# Patient Record
Sex: FEMALE | Race: BLACK OR AFRICAN AMERICAN | Employment: OTHER | URBAN - METROPOLITAN AREA
[De-identification: names, ages, dates, MRNs, and addresses within clinical notes are randomized per-mention and may not be internally consistent; named-entity substitution may affect disease eponyms.]

---

## 2019-07-30 LAB
CHLAMYDIA, EXTERNAL: NORMAL
HBSAG, EXTERNAL: NORMAL
HEPATITIS C AB,   EXT: NORMAL
HIV, EXTERNAL: NORMAL
N. GONORRHEA, EXTERNAL: NORMAL
RPR, EXTERNAL: NORMAL
RUBELLA, EXTERNAL: NORMAL

## 2019-09-06 ENCOUNTER — HOSPITAL ENCOUNTER (INPATIENT)
Age: 39
LOS: 1 days | Discharge: HOME | DRG: 566 | End: 2019-09-07
Attending: EMERGENCY MEDICINE | Admitting: HOSPITALIST
Payer: MEDICAID

## 2019-09-06 DIAGNOSIS — O99.019 ANEMIA AFFECTING FIFTH PREGNANCY: ICD-10-CM

## 2019-09-06 DIAGNOSIS — O09.40 ANEMIA AFFECTING FIFTH PREGNANCY: ICD-10-CM

## 2019-09-06 DIAGNOSIS — D50.8 OTHER IRON DEFICIENCY ANEMIA: Primary | ICD-10-CM

## 2019-09-06 PROBLEM — D64.9 ANEMIA: Status: ACTIVE | Noted: 2019-09-06

## 2019-09-06 PROBLEM — Z34.82 SUPERVISION OF NORMAL INTRAUTERINE PREGNANCY IN MULTIGRAVIDA IN SECOND TRIMESTER: Status: ACTIVE | Noted: 2019-09-06

## 2019-09-06 PROBLEM — O13.2 PREGNANCY-INDUCED HYPERTENSION IN SECOND TRIMESTER: Status: ACTIVE | Noted: 2019-09-06

## 2019-09-06 LAB
FERRITIN SERPL-MCNC: 5 NG/ML (ref 8–388)
FOLATE SERPL-MCNC: >20 NG/ML (ref 3.1–17.5)
IRON SATN MFR SERPL: 4 %
IRON SERPL-MCNC: 21 UG/DL (ref 50–175)
RETICS/RBC NFR AUTO: 1.8 % (ref 0.5–2.3)
TIBC SERPL-MCNC: 520 UG/DL (ref 250–450)
VIT B12 SERPL-MCNC: 582 PG/ML (ref 211–911)

## 2019-09-06 PROCEDURE — 30233N1 TRANSFUSION OF NONAUTOLOGOUS RED BLOOD CELLS INTO PERIPHERAL VEIN, PERCUTANEOUS APPROACH: ICD-10-PCS | Performed by: HOSPITALIST

## 2019-09-06 PROCEDURE — 86900 BLOOD TYPING SEROLOGIC ABO: CPT

## 2019-09-06 PROCEDURE — 65270000029 HC RM PRIVATE

## 2019-09-06 PROCEDURE — 85045 AUTOMATED RETICULOCYTE COUNT: CPT

## 2019-09-06 PROCEDURE — 82607 VITAMIN B-12: CPT

## 2019-09-06 PROCEDURE — 83540 ASSAY OF IRON: CPT

## 2019-09-06 PROCEDURE — 36430 TRANSFUSION BLD/BLD COMPNT: CPT

## 2019-09-06 PROCEDURE — 86923 COMPATIBILITY TEST ELECTRIC: CPT

## 2019-09-06 PROCEDURE — 99284 EMERGENCY DEPT VISIT MOD MDM: CPT

## 2019-09-06 PROCEDURE — P9016 RBC LEUKOCYTES REDUCED: HCPCS

## 2019-09-06 PROCEDURE — 82728 ASSAY OF FERRITIN: CPT

## 2019-09-06 PROCEDURE — 74011250637 HC RX REV CODE- 250/637: Performed by: HOSPITALIST

## 2019-09-06 PROCEDURE — 4A1HXCZ MONITORING OF PRODUCTS OF CONCEPTION, CARDIAC RATE, EXTERNAL APPROACH: ICD-10-PCS | Performed by: OBSTETRICS & GYNECOLOGY

## 2019-09-06 RX ORDER — SODIUM CHLORIDE 9 MG/ML
250 INJECTION, SOLUTION INTRAVENOUS AS NEEDED
Status: DISCONTINUED | OUTPATIENT
Start: 2019-09-06 | End: 2019-09-07 | Stop reason: HOSPADM

## 2019-09-06 RX ORDER — AMLODIPINE BESYLATE 5 MG/1
10 TABLET ORAL DAILY
Status: DISCONTINUED | OUTPATIENT
Start: 2019-09-06 | End: 2019-09-07 | Stop reason: HOSPADM

## 2019-09-06 RX ADMIN — AMLODIPINE BESYLATE 10 MG: 5 TABLET ORAL at 18:29

## 2019-09-06 NOTE — ED PROVIDER NOTES
EMERGENCY DEPARTMENT HISTORY AND PHYSICAL EXAM    Date: 2019  Patient Name: Jacy Garcia    History of Presenting Illness     Chief Complaint   Patient presents with    Abnormal Lab Results         History Provided By: Patient    Additional History (Context):   Minor Mary Carmen Cabral is a 46 y.o. female with history of chronic anemia, G5, P4 presents to the emergency department C/O low hemoglobin on labs drawn today. Patient was seen by her OB/GYN Dr. Jesse Aguilera for her pre-existing hypertension. Was started on amlodipine however on labs today was noted to have a hemoglobin of 5.8. Pt denies bloody stools, black or tarry stools, hematuria, vaginal bleeding, vaginal pain, abdominal pain, nausea, vomiting, fever, and any other sxs or complaints. PCP: None    Current Facility-Administered Medications   Medication Dose Route Frequency Provider Last Rate Last Dose    0.9% sodium chloride infusion 250 mL  250 mL IntraVENous PRN Agustiady-Becker, Severiano Crete, DO         Current Outpatient Medications   Medication Sig Dispense Refill    amLODIPine (NORVASC) 10 mg tablet Take 10 mg by mouth daily.     Indications: HYPERTENSION         Past History     Past Medical History:  Past Medical History:   Diagnosis Date    Anemia NEC     history of    Essential hypertension     Hypertension     chroni c        Past Surgical History:  Past Surgical History:   Procedure Laterality Date    HX BACK SURGERY  ~     rods in back for scoliosis    HX  SECTION  2000    preeclampsia    HX  SECTION  2001    repeat c/s preeclampsia    HX  SECTION  2002    elective repeat c/s       Family History:  Family History   Problem Relation Age of Onset    Arthritis-osteo Maternal Grandmother        Social History:  Social History     Tobacco Use    Smoking status: Never Smoker    Smokeless tobacco: Never Used   Substance Use Topics    Alcohol use: No     Alcohol/week: 0.0 standard drinks    Drug use: No       Allergies:  No Known Allergies      Review of Systems   Review of Systems   Constitutional: Negative for chills and fever. HENT: Negative for congestion, ear pain, sinus pain and sore throat. Eyes: Negative for pain and visual disturbance. Respiratory: Negative for cough and shortness of breath. Cardiovascular: Negative for chest pain and leg swelling. Gastrointestinal: Negative for abdominal pain, constipation, diarrhea, nausea and vomiting. Genitourinary: Negative for dysuria, hematuria, vaginal bleeding and vaginal discharge. Musculoskeletal: Negative for back pain and neck pain. Skin: Negative for rash and wound. Neurological: Negative for dizziness, tremors, weakness, light-headedness and numbness. All other systems reviewed and are negative. Physical Exam     Vitals:    09/06/19 1451   BP: (!) 166/93   Pulse: (!) 103   Resp: 20   Temp: 98.7 °F (37.1 °C)   SpO2: 100%   Weight: 59.9 kg (132 lb)   Height: 5' 5\" (1.651 m)     Physical Exam    Nursing note and vitals reviewed    Constitutional: Majo Duckworth -American female, no acute distress  Head: Normocephalic, Atraumatic  Eyes: Pupils are equal, round, and reactive to light, EOMI, pale conjunctiva  Neck: Supple, non-tender  Cardiovascular: Tachycardic, no murmurs, rubs, or gallops  Chest: Normal work of breathing and chest excursion bilaterally  Lungs: Clear to ausculation bilaterally, no wheezes, no rhonchi  Abdomen: Soft, non tender, non distended, normoactive bowel sounds  Back: No evidence of trauma or deformity  Extremities: No evidence of trauma or deformity, no LE edema  Skin: Warm and dry, normal cap refill  Neuro: Alert and appropriate, CN intact, normal speech, moving all 4 extremities freely and symmetrically  Psychiatric: Normal mood and affect       Diagnostic Study Results     Labs -   No results found for this or any previous visit (from the past 12 hour(s)).     Radiologic Studies -   No orders to display     CT Results  (Last 48 hours)    None        CXR Results  (Last 48 hours)    None            Medical Decision Making   I am the first provider for this patient. I reviewed the vital signs, available nursing notes, past medical history, past surgical history, family history and social history. Vital Signs-Reviewed the patient's vital signs. Pulse Oximetry Analysis -100 % on room air    Records Reviewed: Nursing Notes and Old Medical Records    Provider Notes:   46 y.o. female G5, P4 presenting with known anemia. On exam patient is afebrile. Hypertensive, tachycardic. Pale conjunctiva however not appearing acutely ill or in acute respiratory distress. Patient had blood work drawn today which showed a hemoglobin of 5.8. Will consent patient for transfusion and admit. Procedures:  Procedures    ED Course:   3:00 PM   Initial assessment performed. The patients presenting problems have been discussed, and they are in agreement with the care plan formulated and outlined with them. I have encouraged them to ask questions as they arise throughout their visit. Diagnosis and Disposition       Core Measures:  For Hospitalized Patients:    1. Hospitalization Decision Time:  The decision to hospitalize the patient was made by Citlalli Boland DO at 3:10 PM on 9/6/2019    2. Aspirin: Aspirin was not given because the patient did not present with a stroke at the time of their Emergency Department evaluation    3:10 PM  Patient is being admitted to the hospital by Marcia Bajwa MD  . The results of their tests and reasons for their admission have been discussed with them and/or available family. They convey agreement and understanding for the need to be admitted and for their admission diagnosis. CLINICAL IMPRESSION:    1. Other iron deficiency anemia    2.  Anemia affecting fifth pregnancy      ____________________________________     Please note that this dictation was completed with Dragon, the computer voice recognition software. Quite often unanticipated grammatical, syntax, homophones, and other interpretive errors are inadvertently transcribed by the computer software. Please disregard these errors. Please excuse any errors that have escaped final proofreading.

## 2019-09-06 NOTE — ROUTINE PROCESS
TRANSFER - IN REPORT:    Verbal report received from [facility], [address] [facility] (name) on Brandie Morley  being received from ED (unit) for routine progression of care      Report consisted of patients Situation, Background, Assessment and   Recommendations(SBAR). Information from the following report(s) SBAR, Kardex, ED Summary, Intake/Output, MAR and Recent Results was reviewed with the receiving nurse. Opportunity for questions and clarification was provided. Assessment completed upon patients arrival to unit and care assumed. RN clarified with Chris Freedman RN from [facility] that this pt is appropriate for medical floor. Per Chris Freedman, no special monitoring indicated at this phase as fetus is not yet viable. Nursing supervisor, Ju Garcia, aware of interaction.

## 2019-09-06 NOTE — ED NOTES
TRANSFER - OUT REPORT:    Verbal report given to Pedro Luis Garay (name) on Loree Gilbertmauricio  being transferred to Medical (unit) for routine progression of care       Report consisted of patients Situation, Background, Assessment and   Recommendations(SBAR). Information from the following report(s) SBAR, ED Summary and MAR was reviewed with the receiving nurse. Lines:   Peripheral IV 09/06/19 Left Arm (Active)   Site Assessment Clean, dry, & intact 9/6/2019  3:36 PM   Phlebitis Assessment 0 9/6/2019  3:36 PM   Infiltration Assessment 0 9/6/2019  3:36 PM   Dressing Status Clean, dry, & intact 9/6/2019  3:36 PM   Dressing Type Transparent 9/6/2019  3:36 PM        Opportunity for questions and clarification was provided.       Patient transported with:   Registered Nurse

## 2019-09-06 NOTE — PROGRESS NOTES
Summary -- Pt comfortable upon arrival to unit. Tolerating blood transfusion well. RN received TO from [facility] for BID fetal monitoring, to begin tonight. L&D aware. Pt in good spirits, excellent appetite. Pt hypertensive, states has experienced similar HTN before, when last pregnant (7yrs ago). States BP was very responsive to 10mg Norvasc, which pt initiated this evening. Able to provide teachback on med/side effects. No new concerns/complaints. Blood re-verified with receiving PM nurse. Patient Vitals for the past 8 hrs:   Temp Pulse Resp BP SpO2   09/06/19 1922 98.6 °F (37 °C) 99 15 (!) 176/94 100 %   09/06/19 1909 98.8 °F (37.1 °C) 85 16 (!) 158/98 100 %   09/06/19 1839 98.8 °F (37.1 °C) 98 16 (!) 177/106 100 %   09/06/19 1756 98.1 °F (36.7 °C) 86 16 (!) 178/98 100 %   09/06/19 1745 98.6 °F (37 °C) 89 17 (!) 186/91    09/06/19 1731 98.6 °F (37 °C) 86 20 (!) 187/94 100 %   09/06/19 1726 98.4 °F (36.9 °C) 91 25 (!) 195/110 100 %   09/06/19 1719 99.1 °F (37.3 °C) 88 19 (!) 169/105 100 %   09/06/19 1714 99.1 °F (37.3 °C) 90 16 (!) 190/96 100 %   09/06/19 1702 98.4 °F (36.9 °C) 88 17 (!) 172/101 100 %   09/06/19 1644 98.5 °F (36.9 °C) 90 13 (!) 178/101 100 %   09/06/19 1532  97 22 (!) 190/97    09/06/19 1451 98.7 °F (37.1 °C) (!) 103 20 (!) 166/93 100 %       Bedside shift change report given to DESHAUN Doe (oncoming nurse) by Chares Lundborg, RN (offgoing nurse). Report included the following information SBAR, Kardex, ED Summary, Intake/Output, MAR and Recent Results.

## 2019-09-06 NOTE — H&P
History & Physical    Patient: Danielle Liu MRN: [id number]  CSN: [id number]    YOB: 1980  Age: [age] [facility] y.o. Sex: female      DOA: 2019  Primary Care Provider:  None      Assessment/Plan     Patient Active Problem List   Diagnosis Code    Anemia D64.9    Supervision of normal intrauterine pregnancy in multigravida in second trimester Z34.82    Pregnancy-induced hypertension in second trimester O13.2       Admit to medical floor    Anemia - likely iron def, she is getting blood transfusion. Follow H/H  Check iron panel, ferritin, B12/folate  Denies any dark stools or other bleeding. PIH - she was started on norvasc by Dr. Krystin iL, will continue this. She has not tolerated labetalol before. Estimated length of stay : 1-2 days    CC: anemia       HPI:     Danielle Liu is a [age] [facility] y.o. female who is  is sent to ER by Dr. Krystin Li secondary to anemia. She was seen by him in clinic today and blood work showed Hb of 5.8. She denies any bleeding issues or dark stools. She has been feeling dizzy and weak. She also has history of PIH. Her BP has been elevated. She was initially given labetalol however she did not tolerate this. It was switched to norvasc. She denies any chest pain, SOB, fever/chills.     Past Medical History:   Diagnosis Date    Anemia NEC     history of    Essential hypertension     Hypertension     chroni c        Past Surgical History:   Procedure Laterality Date    HX BACK SURGERY  ~     rods in back for scoliosis    HX  SECTION  2000    preeclampsia    HX  SECTION  2001    repeat c/s preeclampsia    HX  SECTION  2002    elective repeat c/s       Family History   Problem Relation Age of Onset    Arthritis-osteo Maternal Grandmother        Social History     Socioeconomic History    Marital status:      Spouse name: Not on file    Number of children: Not on file    Years of education: Not on file  Highest education level: Not on file   Tobacco Use    Smoking status: Never Smoker    Smokeless tobacco: Never Used   Substance and Sexual Activity    Alcohol use: No     Alcohol/week: 0.0 standard drinks    Drug use: No    Sexual activity: Yes     Partners: Male     Birth control/protection: None       Prior to Admission medications    Medication Sig Start Date End Date Taking? Authorizing Provider   amLODIPine (NORVASC) 10 mg tablet Take 10 mg by mouth daily. Indications: HYPERTENSION    Provider, Historical       No Known Allergies    Review of Systems  Gen: No fever, chills, malaise, weight loss/gain. Heent: No headache, rhinorrhea, epistaxis, ear pain, hearing loss, sinus pain, neck pain/stiffness, sore throat. Heart: No chest pain, palpitations, DANG, pnd, or orthopnea. Resp: No cough, hemoptysis, wheezing and shortness of breath. GI: No nausea, vomiting, diarrhea, constipation, melena or hematochezia. : see above. Derm: No rash, new skin lesion or pruritis. Musc/skeletal: no bone or joint complains. Vasc: No edema, cyanosis or claudication. Endo: No heat/cold intolerance, no polyuria,polydipsia or polyphagia. Neuro: No unilateral weakness, numbness, tingling. No seizures. Heme: see above          Physical Exam:     Physical Exam:  Visit Vitals  BP (!) 195/110 (BP 1 Location: Left arm, BP Patient Position: At rest)   Pulse 91   Temp 98.4 °F (36.9 °C)   Resp 25   Ht 5' 5\" (1.651 m)   Wt 59.9 kg (132 lb)   SpO2 100%   BMI 21.97 kg/m²      O2 Device: Room air    Temp (24hrs), Av.7 °F (37.1 °C), Min:98.4 °F (36.9 °C), Max:99.1 °F (37.3 °C)    No intake/output data recorded. No intake/output data recorded. General:  Awake, cooperative, no distress. Head:  Normocephalic, without obvious abnormality, atraumatic. Eyes:  Conjunctivae pale/corneas clear, sclera anicteric, PERRL, EOMs intact. Nose: Nares normal. No drainage or sinus tenderness.    Throat: Lips, mucosa, and tongue normal.    Neck: Supple, symmetrical, trachea midline, no adenopathy. Lungs:   Clear to auscultation bilaterally. Heart:   S1, S2, no murmur, click, rub or gallop. Abdomen: Soft, non-tender, consistent with second trimester pregnancy. Bowel sounds normal. No masses,  No organomegaly. Extremities: Extremities normal, atraumatic, no cyanosis or edema. Capillary refill normal.   Pulses: 2+ and symmetric all extremities. Skin: Skin color pink, turgor normal. No rashes or lesions   Neurologic: CNII-XII intact. No focal motor or sensory deficit.        Labs Reviewed:    CMP: No results found for: NA, K, CL, CO2, AGAP, GLU, BUN, CREA, GFRAA, GFRNA, CA, MG, PHOS, ALB, TBIL, TP, ALB, GLOB, AGRAT, SGOT, ALT, GPT  CBC: No results found for: WBC, HGB, HGBEXT, HCT, HCTEXT, PLT, PLTEXT, HGBEXT, HCTEXT, PLTEXT      Procedures/imaging: see electronic medical records for all procedures/Xrays and details which were not copied into this note but were reviewed prior to creation of Plan        CC: None

## 2019-09-07 VITALS
HEART RATE: 91 BPM | WEIGHT: 130.51 LBS | BODY MASS INDEX: 21.74 KG/M2 | SYSTOLIC BLOOD PRESSURE: 158 MMHG | TEMPERATURE: 98.1 F | RESPIRATION RATE: 16 BRPM | DIASTOLIC BLOOD PRESSURE: 72 MMHG | OXYGEN SATURATION: 100 % | HEIGHT: 65 IN

## 2019-09-07 LAB
ABO + RH BLD: NORMAL
ANION GAP SERPL CALC-SCNC: 9 MMOL/L (ref 3–18)
BLD PROD TYP BPU: NORMAL
BLOOD GROUP ANTIBODIES SERPL: NORMAL
BPU ID: NORMAL
BUN SERPL-MCNC: 6 MG/DL (ref 7–18)
BUN/CREAT SERPL: 11 (ref 12–20)
CALCIUM SERPL-MCNC: 7.9 MG/DL (ref 8.5–10.1)
CALLED TO:,BCALL1: NORMAL
CHLORIDE SERPL-SCNC: 104 MMOL/L (ref 100–111)
CO2 SERPL-SCNC: 23 MMOL/L (ref 21–32)
CREAT SERPL-MCNC: 0.57 MG/DL (ref 0.6–1.3)
CROSSMATCH RESULT,%XM: NORMAL
ERYTHROCYTE [DISTWIDTH] IN BLOOD BY AUTOMATED COUNT: 21.6 % (ref 11.6–14.5)
GLUCOSE SERPL-MCNC: 77 MG/DL (ref 74–99)
HCT VFR BLD AUTO: 25.8 % (ref 35–45)
HGB BLD-MCNC: 7.8 G/DL (ref 12–16)
MCH RBC QN AUTO: 20.4 PG (ref 24–34)
MCHC RBC AUTO-ENTMCNC: 30.2 G/DL (ref 31–37)
MCV RBC AUTO: 67.4 FL (ref 74–97)
PLATELET # BLD AUTO: 440 K/UL (ref 135–420)
PMV BLD AUTO: 8.6 FL (ref 9.2–11.8)
POTASSIUM SERPL-SCNC: 3.7 MMOL/L (ref 3.5–5.5)
RBC # BLD AUTO: 3.83 M/UL (ref 4.2–5.3)
SODIUM SERPL-SCNC: 136 MMOL/L (ref 136–145)
SPECIMEN EXP DATE BLD: NORMAL
STATUS OF UNIT,%ST: NORMAL
UNIT DIVISION, %UDIV: 0
WBC # BLD AUTO: 9 K/UL (ref 4.6–13.2)

## 2019-09-07 PROCEDURE — 80048 BASIC METABOLIC PNL TOTAL CA: CPT

## 2019-09-07 PROCEDURE — 85027 COMPLETE CBC AUTOMATED: CPT

## 2019-09-07 PROCEDURE — 74011250637 HC RX REV CODE- 250/637: Performed by: HOSPITALIST

## 2019-09-07 PROCEDURE — 36415 COLL VENOUS BLD VENIPUNCTURE: CPT

## 2019-09-07 PROCEDURE — 74011250637 HC RX REV CODE- 250/637: Performed by: INTERNAL MEDICINE

## 2019-09-07 RX ORDER — ACETAMINOPHEN 325 MG/1
650 TABLET ORAL ONCE
Status: COMPLETED | OUTPATIENT
Start: 2019-09-07 | End: 2019-09-07

## 2019-09-07 RX ORDER — FERROUS SULFATE 324(65)MG
325 TABLET, DELAYED RELEASE (ENTERIC COATED) ORAL
Qty: 60 TAB | Refills: 0 | Status: SHIPPED | OUTPATIENT
Start: 2019-09-07

## 2019-09-07 RX ADMIN — AMLODIPINE BESYLATE 10 MG: 5 TABLET ORAL at 08:49

## 2019-09-07 RX ADMIN — ACETAMINOPHEN 650 MG: 325 TABLET ORAL at 11:34

## 2019-09-07 NOTE — PROGRESS NOTES
Chart reviewed by CM on call. Met with pt at bedside. States is [facility], does not use DME. Lives with spouse who could provide support as need be. No needs identified at this time. Reason for Admission:   anemia                   RRAT Score:  6                   Plan for utilizing home health:    Not at this time                      Current Advanced Directive/Advance Care Plan:                          Transition of Care Plan:   Family support and provider follow up    Care Management Interventions  PCP Verified by CM:  Yes  Mode of Transport at Discharge: (family)  Current Support Network: Lives with Spouse  Confirm Follow Up Transport: Family  Discharge Location  Discharge Placement: Home with family assistance

## 2019-09-07 NOTE — PROGRESS NOTES
1153 Discharge to home. Instructions reviewed with and understood by patient. Prescription given to patient.

## 2019-09-07 NOTE — PROGRESS NOTES
High Risk Obstetrics Progress Note    Name: Sarah Moraes MRN: [id number]  SSN: [id number]    YOB: 1980  Age: 46 y.o. Sex: female      Subjective:      LOS: 1 day    Estimated Date of Delivery: None noted. Gestational Age Today: Unknown     Patient admitted for anemia and hypertension. States she does not have abdominal pain  , chest pain, contractions, headache , pelvic pressure, shortness of breath, vaginal bleeding  and visual disturbances. She feels better - not as fatigued or dizzy. She is eating well. Denies any active source of bleeding. Objective:     Vitals:  Blood pressure (!) 157/101, pulse 79, temperature 98.4 °F (36.9 °C), resp. rate 16, height 5' 5\" (1.651 m), weight 59.2 kg (130 lb 8.2 oz), SpO2 100 %, not currently breastfeeding. Temp (24hrs), Av.5 °F (36.9 °C), Min:97.6 °F (36.4 °C), Max:99.1 °F (72.3 °C)    Systolic (92RDT), HDM:262 , Min:155 , IJW:961      Diastolic (98EIJ), EBV:14, Min:91, Max:110       Intake and Output:     Date 19 0700 - 19 0659   Shift 2491-0127 0383-2800 0926-8131 24 Hour Total   INTAKE   P.O. 200   200   Shift Total(mL/kg) 200(3.4)   200(3.4)   OUTPUT   Shift Total(mL/kg)       Weight (kg) 59.2 59.2 59.2 59.2       Physical Exam:  Patient without distress.   Heart: Regular rate and rhythm, S1S2 present or without murmur or extra heart sounds  Lung: clear to auscultation throughout lung fields, no wheezes, no rales, no rhonchi and normal respiratory effort  Abdomen: soft, nontender  Lower Extremities:  - No evidence of DVT seen on physical exam.       Membranes:  Intact    Uterine Activity:  None    Fetal Heart Rate:  135        Labs:   Recent Results (from the past 36 hour(s))   TYPE + CROSSMATCH    Collection Time: 19  3:30 PM   Result Value Ref Range    Crossmatch Expiration 2019     ABO/Rh(D) B POSITIVE     Antibody screen NEG     CALLED TO:       1 UNIT READY NOTIFIED Patsy Krabbe RN ED AT 1627 ON 19 BY EJA.    Unit number [id number]     Blood component type  LR     Unit division 00     Status of unit TRANSFUSED     Crossmatch result Compatible    IRON PROFILE    Collection Time: 09/06/19  3:30 PM   Result Value Ref Range    Iron 21 (L) 50 - 175 ug/dL    TIBC 520 (H) 250 - 450 ug/dL    Iron % saturation 4 %   FERRITIN    Collection Time: 09/06/19  3:30 PM   Result Value Ref Range    Ferritin 5 (L) 8 - 388 NG/ML   RETICULOCYTE COUNT    Collection Time: 09/06/19  3:30 PM   Result Value Ref Range    Reticulocyte count 1.8 0.5 - 2.3 %   VITAMIN B12 & FOLATE    Collection Time: 09/06/19  3:30 PM   Result Value Ref Range    Vitamin B12 582 211 - 911 pg/mL    Folate >20.0 (H) 3.10 - 17.50 ng/mL   CBC W/O DIFF    Collection Time: 09/07/19  5:58 AM   Result Value Ref Range    WBC 9.0 4.6 - 13.2 K/uL    RBC 3.83 (L) 4.20 - 5.30 M/uL    HGB 7.8 (L) 12.0 - 16.0 g/dL    HCT 25.8 (L) 35.0 - 45.0 %    MCV 67.4 (L) 74.0 - 97.0 FL    MCH 20.4 (L) 24.0 - 34.0 PG    MCHC 30.2 (L) 31.0 - 37.0 g/dL    RDW 21.6 (H) 11.6 - 14.5 %    PLATELET 592 (H) 129 - 420 K/uL    MPV 8.6 (L) 9.2 - 92.2 FL   METABOLIC PANEL, BASIC    Collection Time: 09/07/19  5:58 AM   Result Value Ref Range    Sodium 136 136 - 145 mmol/L    Potassium 3.7 3.5 - 5.5 mmol/L    Chloride 104 100 - 111 mmol/L    CO2 23 21 - 32 mmol/L    Anion gap 9 3.0 - 18 mmol/L    Glucose 77 74 - 99 mg/dL    BUN 6 (L) 7.0 - 18 MG/DL    Creatinine 0.57 (L) 0.6 - 1.3 MG/DL    BUN/Creatinine ratio 11 (L) 12 - 20      GFR est AA >60 >60 ml/min/1.73m2    GFR est non-AA >60 >60 ml/min/1.73m2    Calcium 7.9 (L) 8.5 - 10.1 MG/DL       Assessment and Plan:      Principal Problem:    Anemia (9/6/2019)    Active Problems:    Supervision of normal intrauterine pregnancy in multigravida in second trimester (9/6/2019)      Pregnancy-induced hypertension in second trimester (9/6/2019)       Anemia - s/p transfusion of 1 unit of PRBC's. Symptomatically improved.  She will start iron supplement as an outpatient and f/u next week for a hgb/hct recheck. Hypertension - no evidence of pre eclampsia. Pt with prior history of gestational hypertension and appears to have the same at this time. She did not tolerate labetalol as an outpatient. She was started on Norvasc which she will continue. F/U closely in 2-3 days for bp re check and adjustment of medication as needed. She will keep appt with M scheduled for 9/26/2019.

## 2019-09-07 NOTE — PROGRESS NOTES
2002: Head to toe assessment completed at this time  Patient is A&OX4. Pt denies N/V chest pain and SOB or distress. Pt is calm and cooperative. Pedal pulses are present. Capillary refill less than 3 seconds. Skin in warm and dry. Lungs are clear bilaterally. Bowel sounds are active. Abdomen is soft and non-tender. Positive dorsalis pedis pulse, sensation, warm. No tingling or numbness to lower extremities. 20 g needle in the left arm. Pain scale explained to patient. Patient instructed to call for prn when needed. Pain level is 0. Patient was oriented to call bell and bed function. Will continue to monitor. 2315: Labor and delivery nurse in room completing fetal doppler assessment. 134: Patient is voiding without issue. Patient denies distress, no complaints of pain. Shift summary: Patient had uneventful shift. Patient ambulated without issue.  No issues/concerns at this time

## 2019-09-07 NOTE — DISCHARGE INSTRUCTIONS
Patient Education        Iron Deficiency Anemia: Care Instructions  Your Care Instructions    Anemia means that you do not have enough red blood cells. Red blood cells carry oxygen around your body. When you have anemia, it can make you pale, weak, and tired. Many things can cause anemia. The most common cause is loss of blood. This can happen if you have heavy menstrual periods. It can also happen if you have bleeding in your stomach or bowel. You can also get anemia if you don't have enough iron in your diet or if it's hard for your body to absorb iron. In some cases, pregnancy causes anemia. That's because a pregnant woman needs more iron. Your doctor may do more tests to find the cause of your anemia. If a disease or other health problem is causing it, your doctor will treat that problem. It's important to follow up with your doctor to make sure that your iron level returns to normal.  Follow-up care is a key part of your treatment and safety. Be sure to make and go to all appointments, and call your doctor if you are having problems. It's also a good idea to know your test results and keep a list of the medicines you take. How can you care for yourself at home? · If your doctor recommended iron pills, take them as directed. ? Try to take the pills on an empty stomach. You can do this about 1 hour before or 2 hours after meals. But you may need to take iron with food to avoid an upset stomach. ? Do not take antacids or drink milk or anything with caffeine within 2 hours of when you take your iron. They can keep your body from absorbing the iron well. ? Vitamin C helps your body absorb iron. You may want to take iron pills with a glass of orange juice or some other food high in vitamin C.  ? Iron pills may cause stomach problems. These include heartburn, nausea, diarrhea, constipation, and cramps. It can help to drink plenty of fluids and include fruits, vegetables, and fiber in your diet.   ? It's normal for iron pills to make your stool a greenish or grayish black. But internal bleeding can also cause dark stool. So it's important to tell your doctor about any color changes. ? Call your doctor if you think you are having a problem with your iron pills. Even after you start to feel better, it will take several months for your body to build up its supply of iron. ? If you miss a pill, don't take a double dose. ? Keep iron pills out of the reach of small children. Too much iron can be very dangerous. · Eat foods with a lot of iron. These include red meat, shellfish, poultry, and eggs. They also include beans, raisins, whole-grain bread, and leafy green vegetables. · Steam your vegetables. This is the best way to prepare them if you want to get as much iron as possible. · Be safe with medicines. Do not take nonsteroidal anti-inflammatory pain relievers unless your doctor tells you to. These include aspirin, naproxen (Aleve), and ibuprofen (Advil, Motrin). · Liquid iron can stain your teeth. But you can mix it with water or juice and drink it with a straw. Then it won't get on your teeth. When should you call for help? Call 911 anytime you think you may need emergency care. For example, call if:    · You passed out (lost consciousness).    Call your doctor now or seek immediate medical care if:    · You are short of breath.     · You are dizzy or light-headed, or you feel like you may faint.     · You have new or worse bleeding.    Watch closely for changes in your health, and be sure to contact your doctor if:    · You feel weaker or more tired than usual.     · You do not get better as expected. Where can you learn more? Go to http://niki-stiven.info/. Enter P326 in the search box to learn more about \"Iron Deficiency Anemia: Care Instructions. \"  Current as of: March 28, 2019  Content Version: 12.1  © 9843-0427 [employer].  Care instructions adapted under license by [facility] (which disclaims liability or warranty for this information). If you have questions about a medical condition or this instruction, always ask your healthcare professional. [address] any warranty or liability for your use of this information. Patient Education        Learning About Blood Transfusions  What is a blood transfusion? Blood transfusion is a medical treatment to replace the blood or parts of blood that your body has lost. The blood goes through a tube from a bag to an intravenous (IV) catheter and into your vein. You may need a blood transfusion after losing blood from an injury, a major surgery, an illness that causes bleeding, or an illness that destroys blood cells. Transfusions are also used to give you the parts of blood--such as platelets, plasma, or substances that cause clotting--that your body needs to fight an illness or stop bleeding. How is a blood transfusion done? Before you receive a blood transfusion, your blood is tested to find out what your blood type is. Blood or blood parts that are a match with your blood type are ordered by your doctor. Blood is typed as A, B, AB, or O. It is also typed as Rh-positive or Rh-negative. Your blood is also screened to look for antibodies that might react with the blood that is given to you. The blood you are getting is checked and rechecked to make sure that it's the right type for you. A sample of your blood is mixed with a sample of the blood you will receive to check for problems. Before actually giving you the transfusion, a doctor and nurses will look at the label on the package of blood and compare it to your hospital ID bracelet and medical records. The transfusion begins only when all agree that this is the correct blood and that you are the correct person to receive it. To receive the transfusion, you will have an intravenous (IV) catheter inserted into a vein.  A tube connects the catheter to the bag containing the blood, which is placed higher than your body. The blood then flows slowly into your vein. A doctor or nurse will check you several times during the transfusion to watch for a reaction or other problems. What are the possible risks? Blood transfusions have many benefits and are often life-saving. But they also have a few risks. Possible risks include:  · Your body's reaction to receiving new blood. This may include:  ? Fever. ? Allergic reactions. ? Breathing problems. · An infection from the blood. This risk is small because of the strict rules placed on handling and storing blood. Getting a viral infection, such as HIV or hepatitis B or C, through blood transfusions has become very rare. The U.S. Food and Drug Administration (FDA) enforces strict guidelines on the collection, testing, storage, and use of blood. · Getting the wrong blood type by accident. Severe reactions, which can be life-threatening, are very rare. What can you expect after a blood transfusion? Here are some things you can do at home to help prevent infection at the transfusion site:  · Wash the area daily with warm, soapy water, and pat it dry. Don't use hydrogen peroxide or alcohol, which can slow healing. You may cover the area with a gauze bandage if it weeps or rubs against clothing. Change the bandage every day. · Keep the area clean and dry. When should you call for help? Call 911 anytime you think you may need emergency care. For example, call if:  · You have severe trouble breathing. Call your doctor now or seek immediate medical care if:  · You have a fever. · You feel weaker or more tired than usual.  · You have a yellow tint to your skin or the whites of your eyes. Watch closely for changes in your health, and be sure to contact your doctor if you have any problems. Follow-up care is a key part of your treatment and safety.  Be sure to make and go to all appointments, and call your doctor if you are having problems. It's also a good idea to know your test results and keep a list of the medicines you take. Where can you learn more? Go to http://niki-stiven.info/. Enter V588 in the search box to learn more about \"Learning About Blood Transfusions. \"  Current as of: March 28, 2019  Content Version: 12.1  © 8266-0444 [employer]. Care instructions adapted under license by [employer] (which disclaims liability or warranty for this information). If you have questions about a medical condition or this instruction, always ask your healthcare professional. [place] [address] any warranty or liability for your use of this information.

## 2019-09-07 NOTE — ROUTINE PROCESS
Bedside shift change report given to Thelbert Runner, RN (oncoming nurse) by Chloe Rosa (offgoing nurse). Report included the following information SBAR, Kardex and MAR.

## 2019-09-07 NOTE — DISCHARGE SUMMARY
Discharge Summary  Subjective:       Admit Date: 2019  Discharge Date:  2019, 11:10 AM    Discharging Physician: Hero Ya pager [phone]  Primary Care Provider:  None    Patient ID:  Maulik Bowden  46 y.o. female  1980    Reason for Admission:  Anemia [D64.9]    Discharge Diagnosis:   - iron deficiency anemia  - pregnancy induced hypertesion  - IUP      Patient Active Problem List   Diagnosis Code    Anemia D64.9    Supervision of normal intrauterine pregnancy in multigravida in second trimester Z34.82    Pregnancy-induced hypertension in second trimester O13.2       Consultants:    [address] [place] [address] Course:   Reason for admission ( Admitting HPI): \" Maulik Bowden is a 46 y.o. female who is  is sent to ER by Dr. Annemarie Adams secondary to anemia. She was seen by him in clinic today and blood work showed Hb of 5.8. She denies any bleeding issues or dark stools. She has been feeling dizzy and weak. She also has history of PIH. Her BP has been elevated. She was initially given labetalol however she did not tolerate this. It was switched to norvasc. She denies any chest pain, SOB, fever/chills. \"    She was admitted for symptomatic anemia. She had prbc transfusion which she tolerated. BP remained stable, Hg is 7.8. She will DC home on po iron, has follow up w OB in 1 week for BP check and for repeat CBC.     Pertinent Imaging/ Operative procedures:       Pertinent Results:    BMP:   Lab Results   Component Value Date/Time     2019 05:58 AM    K 3.7 2019 05:58 AM     2019 05:58 AM    CO2 23 2019 05:58 AM    AGAP 9 2019 05:58 AM    GLU 77 2019 05:58 AM    BUN 6 (L) 2019 05:58 AM    CREA 0.57 (L) 2019 05:58 AM    GFRAA >60 2019 05:58 AM    GFRNA >60 2019 05:58 AM     CBC:   Lab Results   Component Value Date/Time    WBC 9.0 2019 05:58 AM    HGB 7.8 (L) 2019 05:58 AM    HCT 25.8 (L) 09/07/2019 05:58 AM     (H) 09/07/2019 05:58 AM         Physical Exam on Discharge:  Visit Vitals  /72   Pulse 79   Temp 98.4 °F (36.9 °C)   Resp 16   Ht 5' 5\" (1.651 m)   Wt 59.2 kg (130 lb 8.2 oz)   SpO2 100%   Breastfeeding? No   BMI 21.72 kg/m²     Body mass index is 21.72 kg/m². GEN: NAD  HEART: S1 S2  NEURO: nonfocal   Condition at discharge: stable    Discharge Medications  Current Discharge Medication List      START taking these medications    Details   ferrous sulfate 324 mg (65 mg iron) tablet Take 1 Tab by mouth Daily (before breakfast). Qty: 60 Tab, Refills: 0         CONTINUE these medications which have NOT CHANGED    Details   amLODIPine (NORVASC) 10 mg tablet Take 10 mg by mouth daily.     Indications: HYPERTENSION             Disposition: home   Follow up:  OB  Restrictions: none    Diagnostic Imaging/Labs pending at discharge: none      Ebonie Rojas, [address] [facility]  Internal Medicine/Geriatrics    Time Spent 25 minutes with >50% coordination of care          CC: None

## 2019-12-17 RX ORDER — HYDROCORTISONE SODIUM SUCCINATE 100 MG/2ML
100 INJECTION, POWDER, FOR SOLUTION INTRAMUSCULAR; INTRAVENOUS AS NEEDED
Status: CANCELLED | OUTPATIENT
Start: 2019-12-23

## 2019-12-17 RX ORDER — SODIUM CHLORIDE 0.9 % (FLUSH) 0.9 %
10 SYRINGE (ML) INJECTION AS NEEDED
Status: CANCELLED
Start: 2019-12-23

## 2019-12-17 RX ORDER — SODIUM CHLORIDE 9 MG/ML
10 INJECTION INTRAMUSCULAR; INTRAVENOUS; SUBCUTANEOUS AS NEEDED
Status: CANCELLED | OUTPATIENT
Start: 2019-12-23

## 2019-12-17 RX ORDER — DIPHENHYDRAMINE HYDROCHLORIDE 50 MG/ML
50 INJECTION, SOLUTION INTRAMUSCULAR; INTRAVENOUS AS NEEDED
Status: CANCELLED
Start: 2019-12-23

## 2019-12-17 RX ORDER — HEPARIN 100 UNIT/ML
300-500 SYRINGE INTRAVENOUS AS NEEDED
Status: CANCELLED
Start: 2019-12-23

## 2019-12-17 RX ORDER — SODIUM CHLORIDE 9 MG/ML
25 INJECTION, SOLUTION INTRAVENOUS CONTINUOUS
Status: CANCELLED | OUTPATIENT
Start: 2019-12-23

## 2019-12-17 RX ORDER — ACETAMINOPHEN 325 MG/1
650 TABLET ORAL AS NEEDED
Status: CANCELLED
Start: 2019-12-23

## 2019-12-17 RX ORDER — ALBUTEROL SULFATE 0.83 MG/ML
2.5 SOLUTION RESPIRATORY (INHALATION) AS NEEDED
Status: CANCELLED
Start: 2019-12-23

## 2019-12-17 RX ORDER — ONDANSETRON 2 MG/ML
8 INJECTION INTRAMUSCULAR; INTRAVENOUS AS NEEDED
Status: CANCELLED | OUTPATIENT
Start: 2019-12-23

## 2019-12-17 RX ORDER — EPINEPHRINE 1 MG/ML
0.3 INJECTION, SOLUTION, CONCENTRATE INTRAVENOUS AS NEEDED
Status: CANCELLED | OUTPATIENT
Start: 2019-12-23

## 2019-12-23 ENCOUNTER — HOSPITAL ENCOUNTER (OUTPATIENT)
Dept: INFUSION THERAPY | Age: 39
End: 2019-12-23

## 2019-12-23 DIAGNOSIS — D64.9 ANEMIA, UNSPECIFIED TYPE: ICD-10-CM

## 2019-12-23 RX ORDER — DIPHENHYDRAMINE HYDROCHLORIDE 50 MG/ML
50 INJECTION, SOLUTION INTRAMUSCULAR; INTRAVENOUS AS NEEDED
Status: CANCELLED
Start: 2019-12-23

## 2019-12-23 RX ORDER — EPINEPHRINE 1 MG/ML
0.3 INJECTION, SOLUTION, CONCENTRATE INTRAVENOUS AS NEEDED
Status: CANCELLED | OUTPATIENT
Start: 2019-12-23

## 2019-12-23 RX ORDER — ALBUTEROL SULFATE 0.83 MG/ML
2.5 SOLUTION RESPIRATORY (INHALATION) AS NEEDED
Status: CANCELLED
Start: 2019-12-23

## 2019-12-23 RX ORDER — HYDROCORTISONE SODIUM SUCCINATE 100 MG/2ML
100 INJECTION, POWDER, FOR SOLUTION INTRAMUSCULAR; INTRAVENOUS AS NEEDED
Status: CANCELLED | OUTPATIENT
Start: 2019-12-23

## 2019-12-23 RX ORDER — SODIUM CHLORIDE 0.9 % (FLUSH) 0.9 %
10 SYRINGE (ML) INJECTION AS NEEDED
Status: CANCELLED
Start: 2019-12-23

## 2019-12-23 RX ORDER — ONDANSETRON 2 MG/ML
8 INJECTION INTRAMUSCULAR; INTRAVENOUS AS NEEDED
Status: CANCELLED | OUTPATIENT
Start: 2019-12-23

## 2019-12-23 RX ORDER — SODIUM CHLORIDE 9 MG/ML
10 INJECTION INTRAMUSCULAR; INTRAVENOUS; SUBCUTANEOUS AS NEEDED
Status: CANCELLED | OUTPATIENT
Start: 2019-12-23

## 2019-12-23 RX ORDER — SODIUM CHLORIDE 9 MG/ML
25 INJECTION, SOLUTION INTRAVENOUS CONTINUOUS
Status: CANCELLED | OUTPATIENT
Start: 2019-12-23

## 2019-12-23 RX ORDER — ACETAMINOPHEN 325 MG/1
650 TABLET ORAL AS NEEDED
Status: CANCELLED
Start: 2019-12-23

## 2019-12-23 RX ORDER — HEPARIN 100 UNIT/ML
300-500 SYRINGE INTRAVENOUS AS NEEDED
Status: CANCELLED
Start: 2019-12-23

## 2019-12-27 ENCOUNTER — ANESTHESIA EVENT (OUTPATIENT)
Dept: LABOR AND DELIVERY | Age: 39
DRG: 540 | End: 2019-12-27
Payer: MEDICAID

## 2019-12-27 ENCOUNTER — HOSPITAL ENCOUNTER (INPATIENT)
Age: 39
LOS: 4 days | Discharge: HOME | DRG: 540 | End: 2019-12-31
Attending: OBSTETRICS & GYNECOLOGY | Admitting: OBSTETRICS & GYNECOLOGY
Payer: MEDICAID

## 2019-12-27 ENCOUNTER — ANESTHESIA (OUTPATIENT)
Dept: LABOR AND DELIVERY | Age: 39
DRG: 540 | End: 2019-12-27
Payer: MEDICAID

## 2019-12-27 PROBLEM — O42.919 PRETERM PREMATURE RUPTURE OF MEMBRANES: Status: ACTIVE | Noted: 2019-12-27

## 2019-12-27 LAB
ALBUMIN SERPL-MCNC: 1.6 G/DL (ref 3.4–5)
ALBUMIN SERPL-MCNC: 2.3 G/DL (ref 3.4–5)
ALBUMIN/GLOB SERPL: 0.5 {RATIO} (ref 0.8–1.7)
ALBUMIN/GLOB SERPL: 0.5 {RATIO} (ref 0.8–1.7)
ALP SERPL-CCNC: 153 U/L (ref 45–117)
ALP SERPL-CCNC: 94 U/L (ref 45–117)
ALT SERPL-CCNC: 31 U/L (ref 13–56)
ALT SERPL-CCNC: 36 U/L (ref 13–56)
ANION GAP SERPL CALC-SCNC: 7 MMOL/L (ref 3–18)
ANION GAP SERPL CALC-SCNC: 8 MMOL/L (ref 3–18)
AST SERPL-CCNC: 43 U/L (ref 10–38)
AST SERPL-CCNC: 49 U/L (ref 10–38)
BASOPHILS # BLD: 0 K/UL (ref 0–0.1)
BASOPHILS # BLD: 0 K/UL (ref 0–0.1)
BASOPHILS NFR BLD: 0 % (ref 0–2)
BASOPHILS NFR BLD: 0 % (ref 0–2)
BILIRUB SERPL-MCNC: 0.3 MG/DL (ref 0.2–1)
BILIRUB SERPL-MCNC: 0.3 MG/DL (ref 0.2–1)
BUN SERPL-MCNC: 6 MG/DL (ref 7–18)
BUN SERPL-MCNC: 9 MG/DL (ref 7–18)
BUN/CREAT SERPL: 13 (ref 12–20)
BUN/CREAT SERPL: 7 (ref 12–20)
CALCIUM SERPL-MCNC: 7.4 MG/DL (ref 8.5–10.1)
CALCIUM SERPL-MCNC: 8.4 MG/DL (ref 8.5–10.1)
CHLORIDE SERPL-SCNC: 107 MMOL/L (ref 100–111)
CHLORIDE SERPL-SCNC: 109 MMOL/L (ref 100–111)
CO2 SERPL-SCNC: 23 MMOL/L (ref 21–32)
CO2 SERPL-SCNC: 24 MMOL/L (ref 21–32)
CREAT SERPL-MCNC: 0.67 MG/DL (ref 0.6–1.3)
CREAT SERPL-MCNC: 0.82 MG/DL (ref 0.6–1.3)
DIFFERENTIAL METHOD BLD: ABNORMAL
DIFFERENTIAL METHOD BLD: ABNORMAL
EOSINOPHIL # BLD: 0.1 K/UL (ref 0–0.4)
EOSINOPHIL # BLD: 0.1 K/UL (ref 0–0.4)
EOSINOPHIL NFR BLD: 0 % (ref 0–5)
EOSINOPHIL NFR BLD: 1 % (ref 0–5)
ERYTHROCYTE [DISTWIDTH] IN BLOOD BY AUTOMATED COUNT: 22.1 % (ref 11.6–14.5)
ERYTHROCYTE [DISTWIDTH] IN BLOOD BY AUTOMATED COUNT: 22.3 % (ref 11.6–14.5)
GLOBULIN SER CALC-MCNC: 3 G/DL (ref 2–4)
GLOBULIN SER CALC-MCNC: 4.6 G/DL (ref 2–4)
GLUCOSE BLD STRIP.AUTO-MCNC: 104 MG/DL (ref 70–110)
GLUCOSE SERPL-MCNC: 120 MG/DL (ref 74–99)
GLUCOSE SERPL-MCNC: 82 MG/DL (ref 74–99)
HCT VFR BLD AUTO: 21.5 % (ref 35–45)
HCT VFR BLD AUTO: 31.8 % (ref 35–45)
HGB BLD-MCNC: 6.5 G/DL (ref 12–16)
HGB BLD-MCNC: 9.8 G/DL (ref 12–16)
LDH SERPL L TO P-CCNC: 235 U/L (ref 81–234)
LYMPHOCYTES # BLD: 1.9 K/UL (ref 0.9–3.6)
LYMPHOCYTES # BLD: 2.2 K/UL (ref 0.9–3.6)
LYMPHOCYTES NFR BLD: 18 % (ref 21–52)
LYMPHOCYTES NFR BLD: 24 % (ref 21–52)
MCH RBC QN AUTO: 25.8 PG (ref 24–34)
MCH RBC QN AUTO: 25.9 PG (ref 24–34)
MCHC RBC AUTO-ENTMCNC: 30.2 G/DL (ref 31–37)
MCHC RBC AUTO-ENTMCNC: 30.8 G/DL (ref 31–37)
MCV RBC AUTO: 84.1 FL (ref 74–97)
MCV RBC AUTO: 85.3 FL (ref 74–97)
MONOCYTES # BLD: 0.7 K/UL (ref 0.05–1.2)
MONOCYTES # BLD: 0.9 K/UL (ref 0.05–1.2)
MONOCYTES NFR BLD: 7 % (ref 3–10)
MONOCYTES NFR BLD: 8 % (ref 3–10)
NEUTS SEG # BLD: 5.3 K/UL (ref 1.8–8)
NEUTS SEG # BLD: 9.3 K/UL (ref 1.8–8)
NEUTS SEG NFR BLD: 67 % (ref 40–73)
NEUTS SEG NFR BLD: 75 % (ref 40–73)
PLATELET # BLD AUTO: 176 K/UL (ref 135–420)
PLATELET # BLD AUTO: 184 K/UL (ref 135–420)
PMV BLD AUTO: 10 FL (ref 9.2–11.8)
PMV BLD AUTO: 10.3 FL (ref 9.2–11.8)
POTASSIUM SERPL-SCNC: 3.8 MMOL/L (ref 3.5–5.5)
POTASSIUM SERPL-SCNC: 4 MMOL/L (ref 3.5–5.5)
PROT SERPL-MCNC: 4.6 G/DL (ref 6.4–8.2)
PROT SERPL-MCNC: 6.9 G/DL (ref 6.4–8.2)
RBC # BLD AUTO: 2.52 M/UL (ref 4.2–5.3)
RBC # BLD AUTO: 3.78 M/UL (ref 4.2–5.3)
SODIUM SERPL-SCNC: 138 MMOL/L (ref 136–145)
SODIUM SERPL-SCNC: 140 MMOL/L (ref 136–145)
URATE SERPL-MCNC: 4.3 MG/DL (ref 2.6–7.2)
WBC # BLD AUTO: 12.4 K/UL (ref 4.6–13.2)
WBC # BLD AUTO: 7.9 K/UL (ref 4.6–13.2)

## 2019-12-27 PROCEDURE — 85025 COMPLETE CBC W/AUTO DIFF WBC: CPT

## 2019-12-27 PROCEDURE — 76010000392 HC C SECN EA ADDL 0.5 HR: Performed by: OBSTETRICS & GYNECOLOGY

## 2019-12-27 PROCEDURE — 74011000250 HC RX REV CODE- 250: Performed by: OBSTETRICS & GYNECOLOGY

## 2019-12-27 PROCEDURE — 76010000391 HC C SECN FIRST 1 HR: Performed by: OBSTETRICS & GYNECOLOGY

## 2019-12-27 PROCEDURE — 59025 FETAL NON-STRESS TEST: CPT

## 2019-12-27 PROCEDURE — 74011000250 HC RX REV CODE- 250: Performed by: ANESTHESIOLOGY

## 2019-12-27 PROCEDURE — 77030011265 HC ELECTRD BLD HEX COVD -A: Performed by: OBSTETRICS & GYNECOLOGY

## 2019-12-27 PROCEDURE — 77030002933 HC SUT MCRYL J&J -A: Performed by: OBSTETRICS & GYNECOLOGY

## 2019-12-27 PROCEDURE — 86920 COMPATIBILITY TEST SPIN: CPT

## 2019-12-27 PROCEDURE — 83615 LACTATE (LD) (LDH) ENZYME: CPT

## 2019-12-27 PROCEDURE — 65270000029 HC RM PRIVATE

## 2019-12-27 PROCEDURE — 74011250637 HC RX REV CODE- 250/637: Performed by: MIDWIFE

## 2019-12-27 PROCEDURE — 77030031139 HC SUT VCRL2 J&J -A: Performed by: OBSTETRICS & GYNECOLOGY

## 2019-12-27 PROCEDURE — 77030040361 HC SLV COMPR DVT MDII -B: Performed by: OBSTETRICS & GYNECOLOGY

## 2019-12-27 PROCEDURE — 74011250636 HC RX REV CODE- 250/636: Performed by: ANESTHESIOLOGY

## 2019-12-27 PROCEDURE — 82962 GLUCOSE BLOOD TEST: CPT

## 2019-12-27 PROCEDURE — 84550 ASSAY OF BLOOD/URIC ACID: CPT

## 2019-12-27 PROCEDURE — 88307 TISSUE EXAM BY PATHOLOGIST: CPT

## 2019-12-27 PROCEDURE — 74011250637 HC RX REV CODE- 250/637: Performed by: ADVANCED PRACTICE MIDWIFE

## 2019-12-27 PROCEDURE — 77030039266 HC ADH SKN EXOFIN S2SG -A: Performed by: OBSTETRICS & GYNECOLOGY

## 2019-12-27 PROCEDURE — 80053 COMPREHEN METABOLIC PANEL: CPT

## 2019-12-27 PROCEDURE — 75410000002 HC LABOR FEE PER 1 HR

## 2019-12-27 PROCEDURE — 74011250636 HC RX REV CODE- 250/636: Performed by: NURSE ANESTHETIST, CERTIFIED REGISTERED

## 2019-12-27 PROCEDURE — 77030014144 HC TY SPN ANES BBMI -B: Performed by: ANESTHESIOLOGY

## 2019-12-27 PROCEDURE — 86900 BLOOD TYPING SEROLOGIC ABO: CPT

## 2019-12-27 PROCEDURE — 75410000003 HC RECOV DEL/VAG/CSECN EA 0.5 HR

## 2019-12-27 PROCEDURE — 77030018836 HC SOL IRR NACL ICUM -A: Performed by: OBSTETRICS & GYNECOLOGY

## 2019-12-27 PROCEDURE — 74011250636 HC RX REV CODE- 250/636: Performed by: MIDWIFE

## 2019-12-27 PROCEDURE — 76060000033 HC ANESTHESIA 1 TO 1.5 HR: Performed by: OBSTETRICS & GYNECOLOGY

## 2019-12-27 PROCEDURE — 75410000003 HC RECOV DEL/VAG/CSECN EA 0.5 HR: Performed by: OBSTETRICS & GYNECOLOGY

## 2019-12-27 PROCEDURE — 86923 COMPATIBILITY TEST ELECTRIC: CPT

## 2019-12-27 PROCEDURE — 99285 EMERGENCY DEPT VISIT HI MDM: CPT

## 2019-12-27 PROCEDURE — 74011250637 HC RX REV CODE- 250/637: Performed by: ANESTHESIOLOGY

## 2019-12-27 RX ORDER — SODIUM CHLORIDE, SODIUM LACTATE, POTASSIUM CHLORIDE, CALCIUM CHLORIDE 600; 310; 30; 20 MG/100ML; MG/100ML; MG/100ML; MG/100ML
125 INJECTION, SOLUTION INTRAVENOUS CONTINUOUS
Status: DISPENSED | OUTPATIENT
Start: 2019-12-27 | End: 2019-12-28

## 2019-12-27 RX ORDER — CEFAZOLIN SODIUM/WATER 2 G/20 ML
2 SYRINGE (ML) INTRAVENOUS ONCE
Status: CANCELLED | OUTPATIENT
Start: 2019-12-27 | End: 2019-12-27

## 2019-12-27 RX ORDER — FACIAL-BODY WIPES
10 EACH TOPICAL
Status: DISCONTINUED | OUTPATIENT
Start: 2019-12-27 | End: 2019-12-27 | Stop reason: SDUPTHER

## 2019-12-27 RX ORDER — OXYCODONE HYDROCHLORIDE 5 MG/1
5 TABLET ORAL
Status: ACTIVE | OUTPATIENT
Start: 2019-12-27 | End: 2019-12-28

## 2019-12-27 RX ORDER — NALOXONE HYDROCHLORIDE 0.4 MG/ML
0.2 INJECTION, SOLUTION INTRAMUSCULAR; INTRAVENOUS; SUBCUTANEOUS
Status: ACTIVE | OUTPATIENT
Start: 2019-12-27 | End: 2019-12-28

## 2019-12-27 RX ORDER — SODIUM CHLORIDE 0.9 % (FLUSH) 0.9 %
5-40 SYRINGE (ML) INJECTION AS NEEDED
Status: DISCONTINUED | OUTPATIENT
Start: 2019-12-27 | End: 2019-12-31 | Stop reason: HOSPADM

## 2019-12-27 RX ORDER — OXYTOCIN/RINGER'S LACTATE 20/1000 ML
125 PLASTIC BAG, INJECTION (ML) INTRAVENOUS CONTINUOUS
Status: DISCONTINUED | OUTPATIENT
Start: 2019-12-27 | End: 2019-12-27 | Stop reason: SDUPTHER

## 2019-12-27 RX ORDER — SODIUM CHLORIDE 9 MG/ML
250 INJECTION, SOLUTION INTRAVENOUS AS NEEDED
Status: DISCONTINUED | OUTPATIENT
Start: 2019-12-27 | End: 2019-12-31

## 2019-12-27 RX ORDER — ZOLPIDEM TARTRATE 5 MG/1
5 TABLET ORAL
Status: DISCONTINUED | OUTPATIENT
Start: 2019-12-27 | End: 2019-12-31 | Stop reason: HOSPADM

## 2019-12-27 RX ORDER — FACIAL-BODY WIPES
10 EACH TOPICAL
Status: DISCONTINUED | OUTPATIENT
Start: 2019-12-27 | End: 2019-12-31 | Stop reason: HOSPADM

## 2019-12-27 RX ORDER — BUPIVACAINE HYDROCHLORIDE 7.5 MG/ML
INJECTION, SOLUTION INTRASPINAL
Status: COMPLETED | OUTPATIENT
Start: 2019-12-27 | End: 2019-12-27

## 2019-12-27 RX ORDER — OXYTOCIN/0.9 % SODIUM CHLORIDE 20/1000 ML
125 PLASTIC BAG, INJECTION (ML) INTRAVENOUS CONTINUOUS
Status: DISCONTINUED | OUTPATIENT
Start: 2019-12-27 | End: 2019-12-31 | Stop reason: HOSPADM

## 2019-12-27 RX ORDER — SODIUM CHLORIDE 0.9 % (FLUSH) 0.9 %
5-40 SYRINGE (ML) INJECTION EVERY 8 HOURS
Status: DISCONTINUED | OUTPATIENT
Start: 2019-12-27 | End: 2019-12-27 | Stop reason: SDUPTHER

## 2019-12-27 RX ORDER — ZOLPIDEM TARTRATE 5 MG/1
5 TABLET ORAL
Status: DISCONTINUED | OUTPATIENT
Start: 2019-12-27 | End: 2019-12-27 | Stop reason: SDUPTHER

## 2019-12-27 RX ORDER — SODIUM CHLORIDE 0.9 % (FLUSH) 0.9 %
5-40 SYRINGE (ML) INJECTION AS NEEDED
Status: DISCONTINUED | OUTPATIENT
Start: 2019-12-27 | End: 2019-12-27 | Stop reason: SDUPTHER

## 2019-12-27 RX ORDER — OXYCODONE AND ACETAMINOPHEN 5; 325 MG/1; MG/1
1-2 TABLET ORAL
Status: DISCONTINUED | OUTPATIENT
Start: 2019-12-27 | End: 2019-12-27 | Stop reason: SDUPTHER

## 2019-12-27 RX ORDER — SIMETHICONE 80 MG
80 TABLET,CHEWABLE ORAL
Status: DISCONTINUED | OUTPATIENT
Start: 2019-12-27 | End: 2019-12-27 | Stop reason: SDUPTHER

## 2019-12-27 RX ORDER — CEFAZOLIN SODIUM 1 G/3ML
INJECTION, POWDER, FOR SOLUTION INTRAMUSCULAR; INTRAVENOUS AS NEEDED
Status: DISCONTINUED | OUTPATIENT
Start: 2019-12-27 | End: 2019-12-27 | Stop reason: HOSPADM

## 2019-12-27 RX ORDER — PROMETHAZINE HYDROCHLORIDE 25 MG/ML
25 INJECTION, SOLUTION INTRAMUSCULAR; INTRAVENOUS
Status: DISCONTINUED | OUTPATIENT
Start: 2019-12-27 | End: 2019-12-31 | Stop reason: HOSPADM

## 2019-12-27 RX ORDER — IBUPROFEN 400 MG/1
800 TABLET ORAL
Status: DISCONTINUED | OUTPATIENT
Start: 2019-12-30 | End: 2019-12-31 | Stop reason: HOSPADM

## 2019-12-27 RX ORDER — ACETAMINOPHEN 325 MG/1
650 TABLET ORAL
Status: DISCONTINUED | OUTPATIENT
Start: 2019-12-27 | End: 2019-12-31 | Stop reason: HOSPADM

## 2019-12-27 RX ORDER — ACETAMINOPHEN 325 MG/1
650 TABLET ORAL
Status: DISCONTINUED | OUTPATIENT
Start: 2019-12-27 | End: 2019-12-27 | Stop reason: SDUPTHER

## 2019-12-27 RX ORDER — SIMETHICONE 80 MG
80 TABLET,CHEWABLE ORAL
Status: DISCONTINUED | OUTPATIENT
Start: 2019-12-27 | End: 2019-12-31 | Stop reason: HOSPADM

## 2019-12-27 RX ORDER — FLUTICASONE PROPIONATE 50 MCG
2 SPRAY, SUSPENSION (ML) NASAL DAILY
Status: DISCONTINUED | OUTPATIENT
Start: 2019-12-28 | End: 2019-12-31 | Stop reason: HOSPADM

## 2019-12-27 RX ORDER — MORPHINE SULFATE 1 MG/ML
INJECTION, SOLUTION EPIDURAL; INTRATHECAL; INTRAVENOUS
Status: COMPLETED | OUTPATIENT
Start: 2019-12-27 | End: 2019-12-27

## 2019-12-27 RX ORDER — KETOROLAC TROMETHAMINE 30 MG/ML
30 INJECTION, SOLUTION INTRAMUSCULAR; INTRAVENOUS EVERY 6 HOURS
Status: DISCONTINUED | OUTPATIENT
Start: 2019-12-27 | End: 2019-12-28

## 2019-12-27 RX ORDER — ONDANSETRON 2 MG/ML
INJECTION INTRAMUSCULAR; INTRAVENOUS AS NEEDED
Status: DISCONTINUED | OUTPATIENT
Start: 2019-12-27 | End: 2019-12-27 | Stop reason: HOSPADM

## 2019-12-27 RX ORDER — IBUPROFEN 400 MG/1
800 TABLET ORAL
Status: DISCONTINUED | OUTPATIENT
Start: 2019-12-30 | End: 2019-12-27 | Stop reason: SDUPTHER

## 2019-12-27 RX ORDER — CEFAZOLIN SODIUM/WATER 2 G/20 ML
2 SYRINGE (ML) INTRAVENOUS ONCE
Status: DISCONTINUED | OUTPATIENT
Start: 2019-12-27 | End: 2019-12-27 | Stop reason: HOSPADM

## 2019-12-27 RX ORDER — KETOROLAC TROMETHAMINE 30 MG/ML
30 INJECTION, SOLUTION INTRAMUSCULAR; INTRAVENOUS
Status: DISCONTINUED | OUTPATIENT
Start: 2019-12-27 | End: 2019-12-28

## 2019-12-27 RX ORDER — ONDANSETRON 2 MG/ML
4 INJECTION INTRAMUSCULAR; INTRAVENOUS
Status: DISCONTINUED | OUTPATIENT
Start: 2019-12-27 | End: 2019-12-31 | Stop reason: HOSPADM

## 2019-12-27 RX ORDER — KETOROLAC TROMETHAMINE 30 MG/ML
30 INJECTION, SOLUTION INTRAMUSCULAR; INTRAVENOUS EVERY 6 HOURS
Status: DISCONTINUED | OUTPATIENT
Start: 2019-12-27 | End: 2019-12-27 | Stop reason: SDUPTHER

## 2019-12-27 RX ORDER — SODIUM CHLORIDE, SODIUM LACTATE, POTASSIUM CHLORIDE, CALCIUM CHLORIDE 600; 310; 30; 20 MG/100ML; MG/100ML; MG/100ML; MG/100ML
INJECTION, SOLUTION INTRAVENOUS
Status: DISCONTINUED | OUTPATIENT
Start: 2019-12-27 | End: 2019-12-27 | Stop reason: HOSPADM

## 2019-12-27 RX ORDER — SODIUM CHLORIDE 0.9 % (FLUSH) 0.9 %
5-40 SYRINGE (ML) INJECTION EVERY 8 HOURS
Status: DISCONTINUED | OUTPATIENT
Start: 2019-12-27 | End: 2019-12-31 | Stop reason: HOSPADM

## 2019-12-27 RX ORDER — DIPHENHYDRAMINE HYDROCHLORIDE 50 MG/ML
25 INJECTION, SOLUTION INTRAMUSCULAR; INTRAVENOUS
Status: DISCONTINUED | OUTPATIENT
Start: 2019-12-27 | End: 2019-12-31 | Stop reason: HOSPADM

## 2019-12-27 RX ORDER — OXYCODONE AND ACETAMINOPHEN 5; 325 MG/1; MG/1
1-2 TABLET ORAL
Status: DISCONTINUED | OUTPATIENT
Start: 2019-12-27 | End: 2019-12-31 | Stop reason: HOSPADM

## 2019-12-27 RX ORDER — METHYLDOPA 250 MG/1
250 TABLET, FILM COATED ORAL 2 TIMES DAILY
COMMUNITY

## 2019-12-27 RX ORDER — LABETALOL HYDROCHLORIDE 5 MG/ML
20 INJECTION, SOLUTION INTRAVENOUS ONCE
Status: COMPLETED | OUTPATIENT
Start: 2019-12-27 | End: 2019-12-27

## 2019-12-27 RX ORDER — PROMETHAZINE HYDROCHLORIDE 25 MG/ML
25 INJECTION, SOLUTION INTRAMUSCULAR; INTRAVENOUS
Status: DISCONTINUED | OUTPATIENT
Start: 2019-12-27 | End: 2019-12-27 | Stop reason: SDUPTHER

## 2019-12-27 RX ORDER — LORATADINE 10 MG/1
10 TABLET ORAL DAILY PRN
Status: DISCONTINUED | OUTPATIENT
Start: 2019-12-27 | End: 2019-12-31 | Stop reason: HOSPADM

## 2019-12-27 RX ORDER — ACETAMINOPHEN 325 MG/1
650 TABLET ORAL
Status: DISCONTINUED | OUTPATIENT
Start: 2019-12-27 | End: 2019-12-28 | Stop reason: SDUPTHER

## 2019-12-27 RX ORDER — OXYTOCIN/RINGER'S LACTATE 20/1000 ML
PLASTIC BAG, INJECTION (ML) INTRAVENOUS
Status: DISCONTINUED | OUTPATIENT
Start: 2019-12-27 | End: 2019-12-27 | Stop reason: HOSPADM

## 2019-12-27 RX ADMIN — FLUTICASONE PROPIONATE 2 SPRAY: 50 SPRAY, METERED NASAL at 16:57

## 2019-12-27 RX ADMIN — MORPHINE SULFATE 0.2 MG: 1 INJECTION, SOLUTION EPIDURAL; INTRATHECAL; INTRAVENOUS at 05:32

## 2019-12-27 RX ADMIN — KETOROLAC TROMETHAMINE 30 MG: 30 INJECTION, SOLUTION INTRAMUSCULAR at 06:21

## 2019-12-27 RX ADMIN — SODIUM CHLORIDE, SODIUM LACTATE, POTASSIUM CHLORIDE, AND CALCIUM CHLORIDE 1000 ML: 600; 310; 30; 20 INJECTION, SOLUTION INTRAVENOUS at 23:00

## 2019-12-27 RX ADMIN — OXYCODONE HYDROCHLORIDE AND ACETAMINOPHEN 2 TABLET: 5; 325 TABLET ORAL at 09:09

## 2019-12-27 RX ADMIN — LABETALOL HYDROCHLORIDE 10 MG: 5 INJECTION INTRAVENOUS at 04:50

## 2019-12-27 RX ADMIN — Medication 999 ML/HR: at 05:32

## 2019-12-27 RX ADMIN — DIPHENHYDRAMINE HYDROCHLORIDE 25 MG: 50 INJECTION, SOLUTION INTRAMUSCULAR; INTRAVENOUS at 21:28

## 2019-12-27 RX ADMIN — SODIUM CHLORIDE, SODIUM LACTATE, POTASSIUM CHLORIDE, AND CALCIUM CHLORIDE 125 ML/HR: 600; 310; 30; 20 INJECTION, SOLUTION INTRAVENOUS at 06:21

## 2019-12-27 RX ADMIN — SODIUM CHLORIDE, SODIUM LACTATE, POTASSIUM CHLORIDE, AND CALCIUM CHLORIDE: 600; 310; 30; 20 INJECTION, SOLUTION INTRAVENOUS at 05:04

## 2019-12-27 RX ADMIN — CEFAZOLIN 2 G: 1 INJECTION, POWDER, FOR SOLUTION INTRAMUSCULAR; INTRAVENOUS at 05:17

## 2019-12-27 RX ADMIN — SODIUM CHLORIDE, SODIUM LACTATE, POTASSIUM CHLORIDE, AND CALCIUM CHLORIDE 125 ML/HR: 600; 310; 30; 20 INJECTION, SOLUTION INTRAVENOUS at 04:50

## 2019-12-27 RX ADMIN — KETOROLAC TROMETHAMINE 30 MG: 30 INJECTION, SOLUTION INTRAMUSCULAR at 12:38

## 2019-12-27 RX ADMIN — LORATADINE 10 MG: 10 TABLET ORAL at 12:38

## 2019-12-27 RX ADMIN — KETOROLAC TROMETHAMINE 30 MG: 30 INJECTION, SOLUTION INTRAMUSCULAR at 18:03

## 2019-12-27 RX ADMIN — Medication 125 ML/HR: at 06:21

## 2019-12-27 RX ADMIN — BUPIVACAINE HYDROCHLORIDE IN DEXTROSE 14 MG: 7.5 INJECTION, SOLUTION SUBARACHNOID at 05:32

## 2019-12-27 RX ADMIN — ONDANSETRON HYDROCHLORIDE 4 MG: 2 INJECTION INTRAMUSCULAR; INTRAVENOUS at 05:22

## 2019-12-27 NOTE — H&P
History & Physical    Name: Xochilt Daniels MRN: [id number]  SSN: [id number]    YOB: 1980  Age: 46 y.o. Sex: female      Subjective:     Estimated Date of Delivery: None noted. OB History    Para Term  AB Living   6 4 2 2   4   SAB TAB Ectopic Molar Multiple Live Births             4      # Outcome Date GA Lbr Patric/2nd Weight Sex Delivery Anes PTL Lv   6 Current            5 Term 12 39w5d  2.519 kg F   N SALMA   4 Term  39w0d   M  LO  N SALMA   3   36w0d   M  LO  N SALMA   2   36w0d   M  LO  N SALMA   1                 Ms. Royal Gates admitted with pregnancy at Unknown for  section due to previous  section and SROM and elevated BP's with hx of poorly controlled hypertension. Prenatal course was complicated by advanced maternal age and PROM, CHTN, hx prior  section x 4. Please see prenatal records for details.     Past Medical History:   Diagnosis Date    Anemia NEC     history of    Essential hypertension     Gestational hypertension     Hypertension     chroni c      Past Surgical History:   Procedure Laterality Date    HX BACK SURGERY  ~     rods in back for scoliosis    HX  SECTION  2000    preeclampsia    HX  SECTION  2001    repeat c/s preeclampsia    HX  SECTION  2002    elective repeat c/s    HX  SECTION      elective repeat c/s     Social History     Occupational History    Not on file   Tobacco Use    Smoking status: Never Smoker    Smokeless tobacco: Never Used   Substance and Sexual Activity    Alcohol use: No     Alcohol/week: 0.0 standard drinks    Drug use: No    Sexual activity: Yes     Partners: Male     Birth control/protection: None     Family History   Problem Relation Age of Onset    Arthritis-osteo Maternal Grandmother        No Known Allergies  Prior to Admission medications    Medication Sig Start Date End Date Taking? Authorizing Provider   PNV No12-Iron-FA-DSS-OM-3 29 mg iron-1 mg -50 mg CPKD Take  by mouth. Yes Provider, Historical   ferrous sulfate 324 mg (65 mg iron) tablet Take 1 Tab by mouth Daily (before breakfast). 19  Yes Diana Cheese, DO   amLODIPine (NORVASC) 10 mg tablet Take 10 mg by mouth daily. Indications: HYPERTENSION   Yes Provider, Historical        Review of Systems: A comprehensive review of systems was negative except for that written in the History of Present Illness. Objective:     Vitals: There were no vitals filed for this visit. Physical Exam:  Patient without distress. Heart: Regular rate and rhythm, S1S2 present or without murmur or extra heart sounds  Lung: clear to auscultation throughout lung fields, no wheezes, no rales, no rhonchi and normal respiratory effort  Abdomen: soft, nontender  Lower Extremities:  - No evidence of DVT seen on physical exam.  Membranes:  Spontaneous Rupture of Membranes; Amniotic Fluid: clear fluid  Fetal Heart Rate: Baseline: 130's per minute    Prenatal Labs:   Lab Results   Component Value Date/Time    ABO/Rh(D) B POSITIVE 2019 03:30 PM    Rubella, External IMMUNE 2012 09:12 AM    GrBStrep, External NEG 2012 09:14 AM    HBsAg, External NEG 2012 09:12 AM    HIV, External NEG 2012 09:13 AM    RPR, External NEG 2012 09:12 AM    Gonorrhea, External NEG 2012 09:13 AM    Chlamydia, External NEG 2012 09:13 AM    ABO,Rh B + (POS) 2012 09:12 AM         Impression/Plan:     Plan:  Admit for  section. Group B Strep was not tested. Discussed the risks of surgery including the risks of bleeding, infection, deep vein thrombosis, and surgical injuries to internal organs including but not limited to the bowels, bladder, rectum, and female reproductive organs. The patient understands the risks; any and all questions were answered to the patient's satisfaction.     CHTN - bp's elevated at admission. Evaluate for pre eclampsia. Continue Norvasc 10 mg a day and Methyldopa 250 mg BID. Anemia - hx anemia s/p transfusion this pregnancy. On iron supplementation. Check h/h now. AMA - pr declined genetic evaluation this pregnancy.      Jeronimo Sanchez M.D.

## 2019-12-27 NOTE — OP NOTES
Section Operative Note     Surgeon(s): Dom Acevedo M.D. Pre-operative Diagnosis: Intrauterine pregnancy at 35 0/7 weeks, CHTN, SROM, Vaginal Bleeding, Hx prior c/s x 4  Post-operative Diagnosis: same as preop diagnosis. Procedure(s) Performed: Repeat Mid Transverse  Section                                               Anesthesia: Spinal  Findings: viable female infant, Apgars 8,9, normal uterus, ovaries, tubes  Complications: none  Estimated Blood Loss: 500 mL  Specimens: placenta, cord blood gases  Procedure:   Patient was taken to the OR after informed consent had been obtained. Spinal  was then placed. She was then placed in a dorsal supine position with a left lateral tilt. She was then prepped and draped in a sterile fashion. Time out was completed. Attention was turned to the abdomen and an Allis test confirmed adequate anesthesia. A Pfannenstiel skin incision was made 3 cm above the symphysis pubis. The incision was carried down to the fascia. The fascia was opened in the midline with sharp dissection. Dense adhesions were noted. The rectus muscle was divided bluntly. The peritoneum was entered through dense scar tissue and adhesions with good visualization of underlying structures. The bladder blade was inserted. There were dense adhesions of the omentum to the anterior uterine aspect as well as the bladder to the lower uterine segment. A window to the uterine wall was created with sharp dissection. Due to the dense bladder adhesions entry into the uterus was made just above the lower uterine segment. . The uterus was scored in the mid uterine segment and then entered in the midline. The uterine incision was extended bilaterally, transversly. The fetal head was flexed, pressure was applied to the abdomen and the fetus was delivered through the uterine incision. Naso and oropharynx were bulb suctioned. The cord was clamped and cut. The infant was handed to the waiting pediatric nurse. Cord segment for cord blood gases was obtained. The placenta was manually extracted and sent for permanent pathology. The uterus was cleared of all clot and debris. The incision was closed with 0 Vicryl in a running fashion. A second layer of 0 vicryl was placed in an imbricating fashion. Excellent hemostasis was noted. The uterus was returned to the abdomen. Interceed was placed across the incision and the anterior uterine wall. The rectus muscles were reapproximated using two interrupted sutures of 0 vicryl in a figure of eight fashion. The fascia was closed in a running fashion with 0 vicryl. The skin was closed with Insorb staples and covered with a sterile dressing. The counts were correct. The mother and child tolerated the procedure well.      Jeronimo Sanchez M.D.

## 2019-12-27 NOTE — ANESTHESIA POSTPROCEDURE EVALUATION
Post-Anesthesia Evaluation and Assessment    Cardiovascular Function/Vital Signs  Visit Vitals  /70   Pulse 80   Temp 36.4 °C (97.6 °F)   Resp 18   Ht 5' 4\" (1.626 m)   Wt 63.5 kg (140 lb)   SpO2 100%   Breastfeeding Unknown   BMI 24.03 kg/m²       Patient is status post Procedure(s):   SECTION. Nausea/Vomiting: Controlled. Postoperative hydration reviewed and adequate. Pain:  Pain Scale 1: Numeric (0 - 10) (19 1015)  Pain Intensity 1: 4 (19)   Managed. Neurological Status:   Neuro (WDL): Within Defined Limits (19 3046)   At baseline. Mental Status and Level of Consciousness: Arousable. Pulmonary Status:   O2 Device: Room air (19 7758)   Adequate oxygenation and airway patent. Complications related to anesthesia: None    Post-anesthesia assessment completed. No concerns. Patient has met all discharge requirements.     Signed By: Amairani Isabel CRNA    2019

## 2019-12-27 NOTE — PROGRESS NOTES
0710 Bedside and Verbal shift change report given to [address] [place] [address] Dr RN  (oncoming nurse) by Dania Sheth RN (offgoing nurse). Report included the following information SBAR, Kardex, Procedure Summary, Intake/Output, MAR, Recent Results and Med Rec Status. 0654 TRANSFER - OUT REPORT:    Verbal report given to MIRNA Hogan RN(name) on Zoie Larsen  being transferred to [facility] (unit) for routine progression of care       Report consisted of patients Situation, Background, Assessment and   Recommendations(SBAR). Information from the following report(s) SBAR, Kardex, Procedure Summary, Intake/Output, MAR, Recent Results and Med Rec Status was reviewed with the receiving nurse. Lines:   Peripheral IV 12/27/19 Left Hand (Active)       Peripheral IV 12/27/19 Right Forearm (Active)        Opportunity for questions and clarification was provided.       Patient transported with:   Registered Nurse

## 2019-12-27 NOTE — LACTATION NOTE
Set mom up with double electric breast pump and educated on how to use initiation mode, pump hygiene, and safe milk storage due to infant in NICU. Mom to pump q 3 hours for 15 minutes on initiation mode. Mom verbalized understanding and no questions at this time.

## 2019-12-27 NOTE — ANESTHESIA PROCEDURE NOTES
Spinal Block    Start time: 12/27/2019 5:11 AM  End time: 12/27/2019 5:15 AM  Performed by: Joe Hickey MD  Authorized by: Joe Hickey MD     Pre-procedure: Indications: at surgeon's request, post-op pain management, procedure for pain and primary anesthetic  Preanesthetic Checklist: patient identified, risks and benefits discussed, anesthesia consent, site marked, patient being monitored and timeout performed      Spinal Block:   Patient Position:  Seated  Prep Region:  Lumbar  Prep: chlorhexidine      Location:  L3-4  Technique:  Single shot    Local Dose (mL):  2    Needle:   Needle Type:   Jj  Needle Gauge:  25 G  Attempts:  1      Events: CSF confirmed, no blood with aspiration and no paresthesia        Assessment:  Insertion:  Uncomplicated  Patient tolerance:  Patient tolerated the procedure well with no immediate complications

## 2019-12-27 NOTE — H&P
History & Physical 
 
Name: Tye Salvador MRN: [id number]  SSN: [id number] YOB: 1980  Age: 46 y.o. Sex: female Subjective:gush of watery blood, DFM, denies headache, visual changes or epigastric pain, denies ctx Estimated Date of Delivery: 2020 OB History Danielle Bustillos 6 Para 4 Term 2  2 AB Living  
4 SAB  
   
 TAB Ectopic Molar Multiple Live Births 4 Ms. Jenet Cogan , a 45 yo G5 [phone], is admitted with pregnancy at 35+0 wga for Presumptive ROM  with positive amnisure. Prenatal course was complicated by advanced maternal age and CHTN, severe anemia, hx  x4. Please see prenatal records for details. No Known Allergies Objective:   
 
Vitals: There were no vitals filed for this visit. Physical Exam: 
Patient without distress. Abdomen: soft, nontender Fundus: soft and non tender Perineum: blood present, amniotic fluid present V/e deferred Membranes:   Premature Rupture of Membranes; Amniotic Fluid: bloody fluid Fetal Heart Rate & Contraction pattern: Baseline: 125 per minute Variability: moderate Accelerations: yes Decelerations: none Uterine contractions: none Prenatal Labs:  
Lab Results Component Value Date/Time  
 Rubella, External IMMUNE 2012 09:12 AM  
 GrBStrep, External NEG 2012 09:14 AM  
 HBsAg, External NEG 2012 09:12 AM  
 HIV, External NEG 2012 09:13 AM  
 RPR, External NEG 2012 09:12 AM  
 Gonorrhea, External NEG 2012 09:13 AM  
 Chlamydia, External NEG 2012 09:13 AM  
 
 
 
Assessment/Plan: PPROM, CHTN with severe range B/P Plan: Admit for Reassuring fetal status, Proceed with repeat  Section ,  Reassuring fetal status without labor,  Recommended proceeding with  delivery.   Risks of bleeding, infection, bladder and bowel damage explained to patient and . They understand the situation and consent to the  delivery. .  Group B Strep was unknown, ancef pre-op, PIH and PCR labs. Signed By:  Alyson Chawla CNM 2019

## 2019-12-27 NOTE — ANESTHESIA PREPROCEDURE EVALUATION
Relevant Problems   No relevant active problems       Anesthetic History   No history of anesthetic complications            Review of Systems / Medical History  Patient summary reviewed, nursing notes reviewed and pertinent labs reviewed    Pulmonary  Within defined limits                 Neuro/Psych             Comments: Scoliosis with s/p haile placement. Previous spinal anesthesia in the past without complications Cardiovascular    Hypertension: well controlled              Exercise tolerance: >4 METS     GI/Hepatic/Renal  Within defined limits              Endo/Other  Within defined limits           Other Findings              Physical Exam    Airway  Mallampati: II  TM Distance: 4 - 6 cm  Neck ROM: normal range of motion   Mouth opening: Normal     Cardiovascular  Regular rate and rhythm,  S1 and S2 normal,  no murmur, click, rub, or gallop             Dental  No notable dental hx       Pulmonary  Breath sounds clear to auscultation               Abdominal  GI exam deferred       Other Findings            Anesthetic Plan    ASA: 2  Anesthesia type: spinal  Risk and benefits fully explained to the patient including bleeding, headache, nerve damage, infection, nausea, back pain, and hemodynamic changes. Patient understands and agrees to the procedure.     Post-op pain plan if not by surgeon: indwelling epidural catheter      Anesthetic plan and risks discussed with: Patient

## 2019-12-27 NOTE — PROGRESS NOTES
0415-Pt arrives to unit w c/o vaginal bleeding. Pt  at 35w0d gestation. Pt reports pain 0/10 on pain scale. Pt taken to [address] [place] [address] for assessment. 0425-Anesthesia at bedside. 0430-DrMortimer Nakai at bedside. C-section called at this time. 0500-Pt rolled to OR for repeat     0602-Pt back in room for routine post-op care    0710-Bedside and verbal shift change report given to CARRIE Perez RN (oncoming nurse) by ROYA Davis RN (offgoing nurse). Report included the following information SBAR, Kardex and MAR.

## 2019-12-27 NOTE — LACTATION NOTE
CNA going in to remove parson. Will return. 1856 helped mom to start using pump. Encouraged mom to set alarm and pump q3. Mom verbalized understanding.

## 2019-12-28 ENCOUNTER — ANESTHESIA EVENT (OUTPATIENT)
Dept: SURGERY | Age: 39
DRG: 540 | End: 2019-12-28
Payer: MEDICAID

## 2019-12-28 ENCOUNTER — ANESTHESIA (OUTPATIENT)
Dept: SURGERY | Age: 39
DRG: 540 | End: 2019-12-28
Payer: MEDICAID

## 2019-12-28 PROBLEM — I95.9 HYPOTENSION: Status: ACTIVE | Noted: 2019-12-28

## 2019-12-28 PROBLEM — Z87.898: Status: ACTIVE | Noted: 2019-12-28

## 2019-12-28 LAB
ALBUMIN SERPL-MCNC: 1.5 G/DL (ref 3.4–5)
ALBUMIN/GLOB SERPL: 0.6 {RATIO} (ref 0.8–1.7)
ALP SERPL-CCNC: 75 U/L (ref 45–117)
ALT SERPL-CCNC: 26 U/L (ref 13–56)
ANION GAP SERPL CALC-SCNC: 8 MMOL/L (ref 3–18)
APTT PPP: 32 SEC (ref 23–36.4)
AST SERPL-CCNC: 34 U/L (ref 10–38)
BASOPHILS # BLD: 0 K/UL (ref 0–0.1)
BASOPHILS NFR BLD: 0 % (ref 0–2)
BILIRUB SERPL-MCNC: 0.3 MG/DL (ref 0.2–1)
BUN SERPL-MCNC: 6 MG/DL (ref 7–18)
BUN/CREAT SERPL: 10 (ref 12–20)
CALCIUM SERPL-MCNC: 7.3 MG/DL (ref 8.5–10.1)
CHLORIDE SERPL-SCNC: 105 MMOL/L (ref 100–111)
CO2 SERPL-SCNC: 23 MMOL/L (ref 21–32)
CREAT SERPL-MCNC: 0.6 MG/DL (ref 0.6–1.3)
DIFFERENTIAL METHOD BLD: ABNORMAL
EOSINOPHIL # BLD: 0 K/UL (ref 0–0.4)
EOSINOPHIL # BLD: 0 K/UL (ref 0–0.4)
EOSINOPHIL # BLD: 0.1 K/UL (ref 0–0.4)
EOSINOPHIL NFR BLD: 0 % (ref 0–5)
ERYTHROCYTE [DISTWIDTH] IN BLOOD BY AUTOMATED COUNT: 20 % (ref 11.6–14.5)
ERYTHROCYTE [DISTWIDTH] IN BLOOD BY AUTOMATED COUNT: 20 % (ref 11.6–14.5)
ERYTHROCYTE [DISTWIDTH] IN BLOOD BY AUTOMATED COUNT: 20.2 % (ref 11.6–14.5)
ERYTHROCYTE [DISTWIDTH] IN BLOOD BY AUTOMATED COUNT: 20.7 % (ref 11.6–14.5)
FIBRINOGEN PPP-MCNC: 454 MG/DL (ref 210–451)
GLOBULIN SER CALC-MCNC: 2.6 G/DL (ref 2–4)
GLUCOSE SERPL-MCNC: 92 MG/DL (ref 74–99)
HCT VFR BLD AUTO: 18.4 % (ref 35–45)
HCT VFR BLD AUTO: 20.7 % (ref 35–45)
HCT VFR BLD AUTO: 22.9 % (ref 35–45)
HCT VFR BLD AUTO: 23.9 % (ref 35–45)
HGB BLD-MCNC: 6 G/DL (ref 12–16)
HGB BLD-MCNC: 6.7 G/DL (ref 12–16)
HGB BLD-MCNC: 7.3 G/DL (ref 12–16)
HGB BLD-MCNC: 7.6 G/DL (ref 12–16)
INR PPP: 1 (ref 0.8–1.2)
INR PPP: 1.1 (ref 0.8–1.2)
LYMPHOCYTES # BLD: 1.6 K/UL (ref 0.9–3.6)
LYMPHOCYTES # BLD: 1.6 K/UL (ref 0.9–3.6)
LYMPHOCYTES # BLD: 1.9 K/UL (ref 0.9–3.6)
LYMPHOCYTES NFR BLD: 12 % (ref 21–52)
LYMPHOCYTES NFR BLD: 13 % (ref 21–52)
LYMPHOCYTES NFR BLD: 13 % (ref 21–52)
MCH RBC QN AUTO: 26.4 PG (ref 24–34)
MCH RBC QN AUTO: 26.7 PG (ref 24–34)
MCH RBC QN AUTO: 26.7 PG (ref 24–34)
MCH RBC QN AUTO: 27 PG (ref 24–34)
MCHC RBC AUTO-ENTMCNC: 31.8 G/DL (ref 31–37)
MCHC RBC AUTO-ENTMCNC: 31.9 G/DL (ref 31–37)
MCHC RBC AUTO-ENTMCNC: 32.4 G/DL (ref 31–37)
MCHC RBC AUTO-ENTMCNC: 32.6 G/DL (ref 31–37)
MCV RBC AUTO: 82.5 FL (ref 74–97)
MCV RBC AUTO: 82.7 FL (ref 74–97)
MCV RBC AUTO: 82.9 FL (ref 74–97)
MCV RBC AUTO: 83.9 FL (ref 74–97)
MONOCYTES # BLD: 0.6 K/UL (ref 0.05–1.2)
MONOCYTES # BLD: 0.7 K/UL (ref 0.05–1.2)
MONOCYTES # BLD: 1.3 K/UL (ref 0.05–1.2)
MONOCYTES NFR BLD: 5 % (ref 3–10)
MONOCYTES NFR BLD: 6 % (ref 3–10)
MONOCYTES NFR BLD: 8 % (ref 3–10)
NEUTS SEG # BLD: 10.2 K/UL (ref 1.8–8)
NEUTS SEG # BLD: 13.1 K/UL (ref 1.8–8)
NEUTS SEG # BLD: 9.9 K/UL (ref 1.8–8)
NEUTS SEG NFR BLD: 80 % (ref 40–73)
NEUTS SEG NFR BLD: 81 % (ref 40–73)
NEUTS SEG NFR BLD: 82 % (ref 40–73)
PLATELET # BLD AUTO: 155 K/UL (ref 135–420)
PLATELET # BLD AUTO: 159 K/UL (ref 135–420)
PLATELET # BLD AUTO: 165 K/UL (ref 135–420)
PLATELET # BLD AUTO: 167 K/UL (ref 135–420)
PMV BLD AUTO: 10.1 FL (ref 9.2–11.8)
PMV BLD AUTO: 10.4 FL (ref 9.2–11.8)
PMV BLD AUTO: 10.8 FL (ref 9.2–11.8)
PMV BLD AUTO: 9.9 FL (ref 9.2–11.8)
POTASSIUM SERPL-SCNC: 3.9 MMOL/L (ref 3.5–5.5)
PROT SERPL-MCNC: 4.1 G/DL (ref 6.4–8.2)
PROTHROMBIN TIME: 13.4 SEC (ref 11.5–15.2)
PROTHROMBIN TIME: 13.7 SEC (ref 11.5–15.2)
RBC # BLD AUTO: 2.22 M/UL (ref 4.2–5.3)
RBC # BLD AUTO: 2.51 M/UL (ref 4.2–5.3)
RBC # BLD AUTO: 2.77 M/UL (ref 4.2–5.3)
RBC # BLD AUTO: 2.85 M/UL (ref 4.2–5.3)
SODIUM SERPL-SCNC: 136 MMOL/L (ref 136–145)
WBC # BLD AUTO: 12.2 K/UL (ref 4.6–13.2)
WBC # BLD AUTO: 12.6 K/UL (ref 4.6–13.2)
WBC # BLD AUTO: 16.3 K/UL (ref 4.6–13.2)
WBC # BLD AUTO: 21.9 K/UL (ref 4.6–13.2)

## 2019-12-28 PROCEDURE — 74011250636 HC RX REV CODE- 250/636: Performed by: OBSTETRICS & GYNECOLOGY

## 2019-12-28 PROCEDURE — 74011250637 HC RX REV CODE- 250/637: Performed by: ADVANCED PRACTICE MIDWIFE

## 2019-12-28 PROCEDURE — 74011250636 HC RX REV CODE- 250/636: Performed by: ANESTHESIOLOGY

## 2019-12-28 PROCEDURE — 85730 THROMBOPLASTIN TIME PARTIAL: CPT

## 2019-12-28 PROCEDURE — 65270000029 HC RM PRIVATE

## 2019-12-28 PROCEDURE — 74011000250 HC RX REV CODE- 250: Performed by: REGISTERED NURSE

## 2019-12-28 PROCEDURE — 30233N1 TRANSFUSION OF NONAUTOLOGOUS RED BLOOD CELLS INTO PERIPHERAL VEIN, PERCUTANEOUS APPROACH: ICD-10-PCS | Performed by: OBSTETRICS & GYNECOLOGY

## 2019-12-28 PROCEDURE — 85025 COMPLETE CBC W/AUTO DIFF WBC: CPT

## 2019-12-28 PROCEDURE — 77030008683 HC TU ET CUF COVD -A: Performed by: ANESTHESIOLOGY

## 2019-12-28 PROCEDURE — 80053 COMPREHEN METABOLIC PANEL: CPT

## 2019-12-28 PROCEDURE — 76210000006 HC OR PH I REC 0.5 TO 1 HR: Performed by: OBSTETRICS & GYNECOLOGY

## 2019-12-28 PROCEDURE — 77030008477 HC STYL SATN SLP COVD -A: Performed by: ANESTHESIOLOGY

## 2019-12-28 PROCEDURE — 74011250636 HC RX REV CODE- 250/636: Performed by: REGISTERED NURSE

## 2019-12-28 PROCEDURE — 74011000258 HC RX REV CODE- 258: Performed by: OBSTETRICS & GYNECOLOGY

## 2019-12-28 PROCEDURE — 77030011210: Performed by: OBSTETRICS & GYNECOLOGY

## 2019-12-28 PROCEDURE — 77030018836 HC SOL IRR NACL ICUM -A: Performed by: OBSTETRICS & GYNECOLOGY

## 2019-12-28 PROCEDURE — 74011000258 HC RX REV CODE- 258: Performed by: REGISTERED NURSE

## 2019-12-28 PROCEDURE — 36430 TRANSFUSION BLD/BLD COMPNT: CPT

## 2019-12-28 PROCEDURE — 74011250637 HC RX REV CODE- 250/637: Performed by: MIDWIFE

## 2019-12-28 PROCEDURE — P9016 RBC LEUKOCYTES REDUCED: HCPCS

## 2019-12-28 PROCEDURE — 85384 FIBRINOGEN ACTIVITY: CPT

## 2019-12-28 PROCEDURE — 77030006643: Performed by: ANESTHESIOLOGY

## 2019-12-28 PROCEDURE — 77030040361 HC SLV COMPR DVT MDII -B: Performed by: OBSTETRICS & GYNECOLOGY

## 2019-12-28 PROCEDURE — 74011000250 HC RX REV CODE- 250: Performed by: OBSTETRICS & GYNECOLOGY

## 2019-12-28 PROCEDURE — 74011250636 HC RX REV CODE- 250/636: Performed by: ADVANCED PRACTICE MIDWIFE

## 2019-12-28 PROCEDURE — 77030031139 HC SUT VCRL2 J&J -A: Performed by: OBSTETRICS & GYNECOLOGY

## 2019-12-28 PROCEDURE — 77030013079 HC BLNKT BAIR HGGR 3M -A: Performed by: ANESTHESIOLOGY

## 2019-12-28 PROCEDURE — 88305 TISSUE EXAM BY PATHOLOGIST: CPT

## 2019-12-28 PROCEDURE — 76010000138 HC OR TIME 0.5 TO 1 HR: Performed by: OBSTETRICS & GYNECOLOGY

## 2019-12-28 PROCEDURE — 76060000032 HC ANESTHESIA 0.5 TO 1 HR: Performed by: OBSTETRICS & GYNECOLOGY

## 2019-12-28 PROCEDURE — 85610 PROTHROMBIN TIME: CPT

## 2019-12-28 PROCEDURE — 36415 COLL VENOUS BLD VENIPUNCTURE: CPT

## 2019-12-28 PROCEDURE — 77030008578 HC TBNG UTER SUC BUSS -A: Performed by: OBSTETRICS & GYNECOLOGY

## 2019-12-28 PROCEDURE — 85027 COMPLETE CBC AUTOMATED: CPT

## 2019-12-28 RX ORDER — CARBOPROST TROMETHAMINE 250 UG/ML
250 INJECTION, SOLUTION INTRAMUSCULAR ONCE
Status: COMPLETED | OUTPATIENT
Start: 2019-12-28 | End: 2019-12-28

## 2019-12-28 RX ORDER — FENTANYL CITRATE 50 UG/ML
25 INJECTION, SOLUTION INTRAMUSCULAR; INTRAVENOUS AS NEEDED
Status: DISCONTINUED | OUTPATIENT
Start: 2019-12-28 | End: 2019-12-28 | Stop reason: HOSPADM

## 2019-12-28 RX ORDER — LIDOCAINE HYDROCHLORIDE 20 MG/ML
INJECTION, SOLUTION EPIDURAL; INFILTRATION; INTRACAUDAL; PERINEURAL AS NEEDED
Status: DISCONTINUED | OUTPATIENT
Start: 2019-12-28 | End: 2019-12-28 | Stop reason: HOSPADM

## 2019-12-28 RX ORDER — KETOROLAC TROMETHAMINE 30 MG/ML
30 INJECTION, SOLUTION INTRAMUSCULAR; INTRAVENOUS EVERY 6 HOURS
Status: DISPENSED | OUTPATIENT
Start: 2019-12-28 | End: 2019-12-29

## 2019-12-28 RX ORDER — SODIUM CHLORIDE, SODIUM LACTATE, POTASSIUM CHLORIDE, CALCIUM CHLORIDE 600; 310; 30; 20 MG/100ML; MG/100ML; MG/100ML; MG/100ML
125 INJECTION, SOLUTION INTRAVENOUS CONTINUOUS
Status: DISCONTINUED | OUTPATIENT
Start: 2019-12-28 | End: 2019-12-31 | Stop reason: HOSPADM

## 2019-12-28 RX ORDER — SODIUM CHLORIDE 9 MG/ML
250 INJECTION, SOLUTION INTRAVENOUS AS NEEDED
Status: DISCONTINUED | OUTPATIENT
Start: 2019-12-28 | End: 2019-12-31

## 2019-12-28 RX ORDER — OXYTOCIN/0.9 % SODIUM CHLORIDE 20/1000 ML
PLASTIC BAG, INJECTION (ML) INTRAVENOUS
Status: DISPENSED
Start: 2019-12-28 | End: 2019-12-29

## 2019-12-28 RX ORDER — MIDAZOLAM HYDROCHLORIDE 1 MG/ML
INJECTION, SOLUTION INTRAMUSCULAR; INTRAVENOUS AS NEEDED
Status: DISCONTINUED | OUTPATIENT
Start: 2019-12-28 | End: 2019-12-28 | Stop reason: HOSPADM

## 2019-12-28 RX ORDER — MAGNESIUM SULFATE 100 %
4 CRYSTALS MISCELLANEOUS AS NEEDED
Status: DISCONTINUED | OUTPATIENT
Start: 2019-12-28 | End: 2019-12-28 | Stop reason: HOSPADM

## 2019-12-28 RX ORDER — DEXAMETHASONE SODIUM PHOSPHATE 4 MG/ML
INJECTION, SOLUTION INTRA-ARTICULAR; INTRALESIONAL; INTRAMUSCULAR; INTRAVENOUS; SOFT TISSUE AS NEEDED
Status: DISCONTINUED | OUTPATIENT
Start: 2019-12-28 | End: 2019-12-28 | Stop reason: HOSPADM

## 2019-12-28 RX ORDER — HYDROMORPHONE HYDROCHLORIDE 1 MG/ML
1 INJECTION, SOLUTION INTRAMUSCULAR; INTRAVENOUS; SUBCUTANEOUS ONCE
Status: COMPLETED | OUTPATIENT
Start: 2019-12-28 | End: 2019-12-28

## 2019-12-28 RX ORDER — PROPOFOL 10 MG/ML
INJECTION, EMULSION INTRAVENOUS AS NEEDED
Status: DISCONTINUED | OUTPATIENT
Start: 2019-12-28 | End: 2019-12-28 | Stop reason: HOSPADM

## 2019-12-28 RX ORDER — TRANEXAMIC ACID 100 MG/ML
INJECTION, SOLUTION INTRAVENOUS
Status: DISPENSED
Start: 2019-12-28 | End: 2019-12-29

## 2019-12-28 RX ORDER — ONDANSETRON 2 MG/ML
INJECTION INTRAMUSCULAR; INTRAVENOUS AS NEEDED
Status: DISCONTINUED | OUTPATIENT
Start: 2019-12-28 | End: 2019-12-28 | Stop reason: HOSPADM

## 2019-12-28 RX ORDER — FLUMAZENIL 0.1 MG/ML
0.2 INJECTION INTRAVENOUS
Status: DISCONTINUED | OUTPATIENT
Start: 2019-12-28 | End: 2019-12-28 | Stop reason: HOSPADM

## 2019-12-28 RX ORDER — DEXTROSE MONOHYDRATE 100 MG/ML
125-250 INJECTION, SOLUTION INTRAVENOUS AS NEEDED
Status: DISCONTINUED | OUTPATIENT
Start: 2019-12-28 | End: 2019-12-28 | Stop reason: HOSPADM

## 2019-12-28 RX ORDER — SODIUM CHLORIDE, SODIUM LACTATE, POTASSIUM CHLORIDE, CALCIUM CHLORIDE 600; 310; 30; 20 MG/100ML; MG/100ML; MG/100ML; MG/100ML
1000 INJECTION, SOLUTION INTRAVENOUS CONTINUOUS
Status: DISCONTINUED | OUTPATIENT
Start: 2019-12-28 | End: 2019-12-28 | Stop reason: HOSPADM

## 2019-12-28 RX ORDER — GLYCOPYRROLATE 0.2 MG/ML
INJECTION INTRAMUSCULAR; INTRAVENOUS AS NEEDED
Status: DISCONTINUED | OUTPATIENT
Start: 2019-12-28 | End: 2019-12-28 | Stop reason: HOSPADM

## 2019-12-28 RX ORDER — SODIUM CHLORIDE 0.9 % (FLUSH) 0.9 %
5-40 SYRINGE (ML) INJECTION AS NEEDED
Status: DISCONTINUED | OUTPATIENT
Start: 2019-12-28 | End: 2019-12-28 | Stop reason: HOSPADM

## 2019-12-28 RX ORDER — CEFAZOLIN SODIUM/WATER 2 G/20 ML
2 SYRINGE (ML) INTRAVENOUS ONCE
Status: ACTIVE | OUTPATIENT
Start: 2019-12-28 | End: 2019-12-29

## 2019-12-28 RX ORDER — FENTANYL CITRATE 50 UG/ML
INJECTION, SOLUTION INTRAMUSCULAR; INTRAVENOUS AS NEEDED
Status: DISCONTINUED | OUTPATIENT
Start: 2019-12-28 | End: 2019-12-28 | Stop reason: HOSPADM

## 2019-12-28 RX ORDER — ACETAMINOPHEN 325 MG/1
650 TABLET ORAL ONCE
Status: COMPLETED | OUTPATIENT
Start: 2019-12-28 | End: 2019-12-28

## 2019-12-28 RX ORDER — HYDROMORPHONE HYDROCHLORIDE 1 MG/ML
0.5 INJECTION, SOLUTION INTRAMUSCULAR; INTRAVENOUS; SUBCUTANEOUS
Status: DISCONTINUED | OUTPATIENT
Start: 2019-12-28 | End: 2019-12-28 | Stop reason: HOSPADM

## 2019-12-28 RX ORDER — NALOXONE HYDROCHLORIDE 0.4 MG/ML
0.1 INJECTION, SOLUTION INTRAMUSCULAR; INTRAVENOUS; SUBCUTANEOUS AS NEEDED
Status: DISCONTINUED | OUTPATIENT
Start: 2019-12-28 | End: 2019-12-28 | Stop reason: HOSPADM

## 2019-12-28 RX ORDER — SODIUM CHLORIDE 0.9 % (FLUSH) 0.9 %
5-40 SYRINGE (ML) INJECTION EVERY 8 HOURS
Status: DISCONTINUED | OUTPATIENT
Start: 2019-12-28 | End: 2019-12-28 | Stop reason: HOSPADM

## 2019-12-28 RX ORDER — METHYLERGONOVINE MALEATE 0.2 MG/ML
0.2 INJECTION INTRAVENOUS ONCE
Status: ACTIVE | OUTPATIENT
Start: 2019-12-28 | End: 2019-12-29

## 2019-12-28 RX ORDER — CLINDAMYCIN PHOSPHATE 900 MG/50ML
900 INJECTION, SOLUTION INTRAVENOUS EVERY 8 HOURS
Status: COMPLETED | OUTPATIENT
Start: 2019-12-28 | End: 2019-12-29

## 2019-12-28 RX ADMIN — FENTANYL CITRATE 50 MCG: 50 INJECTION, SOLUTION INTRAMUSCULAR; INTRAVENOUS at 21:24

## 2019-12-28 RX ADMIN — ONDANSETRON HYDROCHLORIDE 4 MG: 2 INJECTION INTRAMUSCULAR; INTRAVENOUS at 20:55

## 2019-12-28 RX ADMIN — SODIUM CHLORIDE, SODIUM LACTATE, POTASSIUM CHLORIDE, AND CALCIUM CHLORIDE 125 ML/HR: 600; 310; 30; 20 INJECTION, SOLUTION INTRAVENOUS at 23:39

## 2019-12-28 RX ADMIN — GENTAMICIN SULFATE 270 MG: 40 INJECTION, SOLUTION INTRAMUSCULAR; INTRAVENOUS at 22:22

## 2019-12-28 RX ADMIN — GLYCOPYRROLATE 0.2 MG: 0.2 INJECTION INTRAMUSCULAR; INTRAVENOUS at 20:48

## 2019-12-28 RX ADMIN — LIDOCAINE HYDROCHLORIDE 100 MG: 20 INJECTION, SOLUTION EPIDURAL; INFILTRATION; INTRACAUDAL; PERINEURAL at 20:48

## 2019-12-28 RX ADMIN — CARBOPROST TROMETHAMINE 250 MCG: 250 INJECTION, SOLUTION INTRAMUSCULAR at 13:28

## 2019-12-28 RX ADMIN — FENTANYL CITRATE 50 MCG: 50 INJECTION, SOLUTION INTRAMUSCULAR; INTRAVENOUS at 20:48

## 2019-12-28 RX ADMIN — SODIUM CHLORIDE, SODIUM LACTATE, POTASSIUM CHLORIDE, AND CALCIUM CHLORIDE 1000 ML: 600; 310; 30; 20 INJECTION, SOLUTION INTRAVENOUS at 13:01

## 2019-12-28 RX ADMIN — SODIUM CHLORIDE, SODIUM LACTATE, POTASSIUM CHLORIDE, AND CALCIUM CHLORIDE: 600; 310; 30; 20 INJECTION, SOLUTION INTRAVENOUS at 20:38

## 2019-12-28 RX ADMIN — CLINDAMYCIN PHOSPHATE 900 MG: 900 INJECTION, SOLUTION INTRAVENOUS at 23:41

## 2019-12-28 RX ADMIN — ONDANSETRON 4 MG: 2 INJECTION INTRAMUSCULAR; INTRAVENOUS at 13:49

## 2019-12-28 RX ADMIN — DEXAMETHASONE SODIUM PHOSPHATE 4 MG: 4 INJECTION, SOLUTION INTRAMUSCULAR; INTRAVENOUS at 20:56

## 2019-12-28 RX ADMIN — CEFAZOLIN 2 G: 10 INJECTION, POWDER, FOR SOLUTION INTRAVENOUS; PARENTERAL at 20:53

## 2019-12-28 RX ADMIN — MIDAZOLAM 2 MG: 1 INJECTION INTRAMUSCULAR; INTRAVENOUS at 20:38

## 2019-12-28 RX ADMIN — SODIUM CHLORIDE 250 ML: 900 INJECTION, SOLUTION INTRAVENOUS at 17:51

## 2019-12-28 RX ADMIN — HYDROMORPHONE HYDROCHLORIDE 1 MG: 1 INJECTION, SOLUTION INTRAMUSCULAR; INTRAVENOUS; SUBCUTANEOUS at 08:42

## 2019-12-28 RX ADMIN — CARBOPROST TROMETHAMINE 250 MCG: 250 INJECTION, SOLUTION INTRAMUSCULAR at 18:57

## 2019-12-28 RX ADMIN — ACETAMINOPHEN 650 MG: 325 TABLET ORAL at 17:48

## 2019-12-28 RX ADMIN — OXYCODONE HYDROCHLORIDE AND ACETAMINOPHEN 1 TABLET: 5; 325 TABLET ORAL at 23:16

## 2019-12-28 RX ADMIN — PROPOFOL 170 MG: 10 INJECTION, EMULSION INTRAVENOUS at 20:48

## 2019-12-28 RX ADMIN — TRANEXAMIC ACID 1000 MG: 100 INJECTION, SOLUTION INTRAVENOUS at 14:14

## 2019-12-28 RX ADMIN — OXYCODONE HYDROCHLORIDE AND ACETAMINOPHEN 2 TABLET: 5; 325 TABLET ORAL at 12:58

## 2019-12-28 NOTE — PROGRESS NOTES
Problem: Patient Education: Go to Patient Education Activity  Goal: Patient/Family Education  Outcome: Progressing Towards Goal     Problem:  Delivery: Postpartum Day 1  Goal: Activity/Safety  Outcome: Progressing Towards Goal  Goal: Consults, if ordered  Outcome: Progressing Towards Goal  Goal: Diagnostic Test/Procedures  Outcome: Progressing Towards Goal  Goal: Nutrition/Diet  Outcome: Progressing Towards Goal  Goal: Discharge Planning  Outcome: Progressing Towards Goal  Goal: Medications  Outcome: Progressing Towards Goal  Goal: Respiratory  Outcome: Progressing Towards Goal  Goal: Treatments/Interventions/Procedures  Outcome: Progressing Towards Goal  Goal: Psychosocial  Outcome: Progressing Towards Goal  Goal: *Vital signs within defined limits  Outcome: Progressing Towards Goal  Goal: *Labs within defined limits  Outcome: Progressing Towards Goal  Goal: *Hemodynamically stable  Outcome: Progressing Towards Goal  Goal: *Optimal pain control at patient's stated goal  Outcome: Progressing Towards Goal  Goal: *Participates in infant care  Outcome: Progressing Towards Goal  Goal: *Demonstrates progressive activity  Outcome: Progressing Towards Goal  Goal: *Tolerating diet  Outcome: Progressing Towards Goal  Goal: *Performs self perineal care  Outcome: Progressing Towards Goal     Problem: Falls - Risk of  Goal: *Absence of Falls  Description  Document Monae Fall Risk and appropriate interventions in the flowsheet.   Outcome: Progressing Towards Goal  Note: Fall Risk Interventions:                                Problem: Patient Education: Go to Patient Education Activity  Goal: Patient/Family Education  Outcome: Progressing Towards Goal

## 2019-12-28 NOTE — PROGRESS NOTES
0725  Bedside and Verbal shift change report given to DANETTE Cline RN  (oncoming nurse) by Alexa Rangel RN (offgoing nurse). Report included the following information SBAR, Kardex, Intake/Output, MAR and Recent Results. 0800  Spoke with Kenny Oakley CNM. She ordered Dilaudid 1 time for pain management and CBC. If CBC normal, toradol will be resumed. 0830  Completed assessment on patient and gave scheduled pain medication. No further needs at this time. 1045  Rounded on patient and brought fresh ice water and chap stick. Patient refused flonase. No further needs at this time. 1215  Patient stated she got up to the bathroom and she was dizzy and had a gush of blood with a clot. Clot was golf ball sized. Patient also complaining of pain in rectum. When assessing fundus, abdomen is tender, semi-soft, and patient is guarding abdomen complaining of pain. Brought in Lio Hinton RN to assist. Lio Hinton performing fundal massage. Paged Shashank Lin CNM, and Dr. Justin Terry. Patient passed another golf ball sized clot and bleeding is moderate. Dr. Justin Terry ordered LR bolus, methergine, and stat CBC.    1240  Phlebotomist is in to get CBC, unsuccessful. Called medic to come draw blood. 1300  Dr. Justin Terry in to see patient. Ordered TXA and hemabate. 1328  Patient given hemabate. Patient states dizziness gone, but cold and cramping after medication. 1330  Medic in to draw blood. 1335  Patient now warm, cool washcloth placed on forehead. Still dizzy and nauseous. 1350  Zofran given for nausea. Dr. Justin Terry doing ultraound. 1445  VS and fundals q 30 minutes. 1630  Patient IV in left forearm infiltrated. IV removed. 15[address] Seng Juarez CNM notified of H/H.    1718  Dr. Justin Terry called, updated on patient condition. She is going to put in order for 1 unit of blood. Ysitie 6 patient order of tylenol, hung NS, and took VS.     1820  Patient complaining of nausea and pain in bottom.     1830  Blood transfusion started. 1845  VS taken. 1900  GRETTA Guillen is room assessing patient. Felix catheter being inserted. Gave hemabate. Dr. Ashley Hdz in room assessing patient. VS taken. 1910  Patient to go to OR for [facility]. 1930  VS taken. 1930  Bedside and Verbal shift change report given to CHI Carvajal RN (oncoming nurse) by DANETTE Cline RN (offgoing nurse). Report included the following information SBAR, Kardex, Intake/Output, MAR and Recent Results. 1945  Blood rate verified with Adrian Willams RN.

## 2019-12-28 NOTE — PROGRESS NOTES
Patient on toilet upon this RN arrival with Primary RN at side. Patient reports dizziness and lightheadness. Patient moved to bed via wheelchair by tech and primary RN.

## 2019-12-28 NOTE — PROGRESS NOTES
1915- Bedside and Verbal shift change report given to LEONARDO Ford RN (oncoming nurse) by Nelly Whitehead RN (offgoing nurse). Report given with Abraham ECHEVARRIA, MAR and Recent Results.

## 2019-12-28 NOTE — PROGRESS NOTES
1935- Rounded on pt, laying comfortably in bed holding infant. Pt states an itchy feeling not relieved by Claritin, declines Benadryl at this time. Bed in lowest position, call bell within reach. 2040- Rounded on pt, laying comfortably in bed. No needs expressed at this time. Bed in lowest position, call bell in reach. 2110- Assessment performed without complications. Pt in no apparent distress or discomfort. 2145- VSS. Moderate lochia. Pt asymptomatic. Will continue to monitor. 2240- Pt on toilet, pt reports severe dizziness and nausea. Pt cool to touch, clammy skin, and states she feels hot. Pt pad saturated with blood, no clots visible. RRT called, pt assisted back into bed. 1 liter of LR bolus given, pt laying supine. Labs drawn at bedside. Pt stated feeling better once supine, BP stabilized. 2305- Pt /61.    0050- VSS. Received orders for 1 unit B+ blood transfusion. Pt consent signed, pt educated on reaction signs and symptoms. 0114- Blood hung successfully. Pt's VSS.    0130- Pt resting comfortably in bed with eyes closed. Chest expansion noted. VSS.     0149- Rate of blood transfusion increased from 75 to 125mL/hr.    0159- Pt resting comfortably in bed with eyes closed. Chest expansion noted. VSS.    0259- Pt quietly resting, VSS.    0430- Rounded on pt, laying comfortably in bed. Assisted pt to bathroom. Pt voided 450mL. Iris care performed, pt ambulated back to bed with assistance. 0500- Blood transfusion complete. VSS, will continue to monitor. 0630- H&H post blood transfusion 7.6 and 23.9. Diana Bhatti notified. No orders given at this time. 1071- Pt resting comfortably in bed with eyes closed. Chest expansion noted. Bed in lowest position, call bell within reach.

## 2019-12-28 NOTE — CONSULTS
Medicine Consult    Patient:  Krysten Green 46 y.o. female  Asked to evaluate patient by Mother Baby Unit (rapid response)  Primary Care Provider:  Melissa Jorge MD  Date of Admission:  2019  Reason for Consult: hypotension        Assessment/Plan     Patient Active Problem List   Diagnosis Code    Acute on chronic anemia D64.9    Supervision of normal intrauterine pregnancy in multigravida in second trimester Z34.82    Pregnancy-induced hypertension in second trimester O13.2     premature rupture of membranes O42.919    Hypotension I95.9    H/O blood loss Z87.898       PLAN:    16-year-old female with history of anemia and CHTN who is status post RLTCS today for PPROM who had dizziness and hypotension while sitting on the toilet this evening. This is likely due to acute on chronic anemia and blood loss from her C section today. -Rapid response team assisted with moving the patient from the toilet back to the bed  -BP normalized once she was supine  -1L IV fluid bolus given  -STAT CBC and CMP ordered  -Transfuse PRBC if Hgb<7  -Recommend holding antihypertensives for tonight as well as narcotics unless necessary  -Darshan Sim MD  Nocturnist    Thank you for allowing us to participate in this patient's care. HPI:   CC: Krysten Green is a 46 y.o. female with past medical history significant for CHTN and anemia who is s/p RLTCS today after PPROM at 35 weeks. , had uncomplicated C/S and has been having moderate lochia all day today. She had a normal BP prior to voiding but then became dizzy and hypotensive on the toilet this evening. RN took her BP on the toilet and it was 50s/20s and RRT was called.     Past Medical History:   Diagnosis Date    Anemia NEC     history of    Essential hypertension     Gestational hypertension     Hypertension     chroni c      Past Surgical History:   Procedure Laterality Date    HX BACK SURGERY  ~     rods in back for scoliosis    HX  SECTION  2000    preeclampsia    HX  SECTION  2001    repeat c/s preeclampsia    HX  SECTION  2002    elective repeat c/s    HX  SECTION      elective repeat c/s      Social History     Tobacco Use    Smoking status: Never Smoker    Smokeless tobacco: Never Used   Substance Use Topics    Alcohol use: No     Alcohol/week: 0.0 standard drinks    Drug use: No     Family History   Problem Relation Age of Onset    Arthritis-osteo Maternal Grandmother      No current facility-administered medications on file prior to encounter. Current Outpatient Medications on File Prior to Encounter   Medication Sig Dispense Refill    PNV No12-Iron-FA-DSS-OM-3 29 mg iron-1 mg -50 mg CPKD Take  by mouth.  methyldopa (ALDOMET) 250 mg tablet Take 250 mg by mouth two (2) times a day. Indications: high blood pressure      ferrous sulfate 324 mg (65 mg iron) tablet Take 1 Tab by mouth Daily (before breakfast). 60 Tab 0    amLODIPine (NORVASC) 10 mg tablet Take 10 mg by mouth daily.     Indications: HYPERTENSION        No Known Allergies        Review of Systems  Constitutional: No fever, chills, diaphoresis, malaise, fatigue or weight gain/loss or falls  Skin: no itching or rashes  HEENT: no ear discomfort, hearing loss, tinnitus, epistaxis or sore throat  EYES: no blurry vision, double vision or photophobia  CARDIOVASCULAR: no claudication, cp, palpitations, orthopnea, pnd or LE edema  PULMONARY: no cough, wheeze, shortness of breath or sputum production  GI: no nausea, vomiting, diarrhea, abdominal pain, melena, hematemesis or brbpr  : no dysuria, hematuria  MUSCULOSKELETAL: no back pain, joint pain or myalgias  ENDOCRINE: no heat/cold intolerance, polyuria or polydipsia  HEME: no easy bruising or bleeding  NEURO: no unilateral weakness, numbness, tingling or seizures      Physical Exam:      Visit Vitals  /79   Pulse 80   Temp 98.6 °F (37 °C)   Resp 16   Ht 5' 4\" (1.626 m)   Wt 63.5 kg (140 lb)   SpO2 100%   Breastfeeding Unknown   BMI 24.03 kg/m²     Body mass index is 24.03 kg/m². Physical Exam:  GEN: well nourished, pale, laying in bed in no acute distress  HEENT: atraumatic, nose normal,oropharynx clear, MMM  NECK: supple, trachea midline, no supraclavicular or submandibular adenopathy noted  EYES: conjuctiva normal, lids with out lesions, PERRL  HEART: RRR with out m/r/g, pmi nondisplaced, pulses 2+ distally  LUNGS: equal chest wall expansion, cta b/l with out wheezes/rales or rhonchi  AB: soft, +BS, nt/nd no organomegaly, fundus feels firm, dressing c/d/i over incision site  NEURO: alert, awake and oriented x3  SKIN: dry, intact, warm no breakdown noted    Laboratory Studies:    Recent Results (from the past 24 hour(s))   METABOLIC PANEL, COMPREHENSIVE    Collection Time: 12/27/19  4:12 AM   Result Value Ref Range    Sodium 140 136 - 145 mmol/L    Potassium 3.8 3.5 - 5.5 mmol/L    Chloride 109 100 - 111 mmol/L    CO2 23 21 - 32 mmol/L    Anion gap 8 3.0 - 18 mmol/L    Glucose 82 74 - 99 mg/dL    BUN 9 7.0 - 18 MG/DL    Creatinine 0.67 0.6 - 1.3 MG/DL    BUN/Creatinine ratio 13 12 - 20      GFR est AA >60 >60 ml/min/1.73m2    GFR est non-AA >60 >60 ml/min/1.73m2    Calcium 8.4 (L) 8.5 - 10.1 MG/DL    Bilirubin, total 0.3 0.2 - 1.0 MG/DL    ALT (SGPT) 36 13 - 56 U/L    AST (SGOT) 49 (H) 10 - 38 U/L    Alk.  phosphatase 153 (H) 45 - 117 U/L    Protein, total 6.9 6.4 - 8.2 g/dL    Albumin 2.3 (L) 3.4 - 5.0 g/dL    Globulin 4.6 (H) 2.0 - 4.0 g/dL    A-G Ratio 0.5 (L) 0.8 - 1.7     LD    Collection Time: 12/27/19  4:12 AM   Result Value Ref Range     (H) 81 - 234 U/L   URIC ACID    Collection Time: 12/27/19  4:12 AM   Result Value Ref Range    Uric acid 4.3 2.6 - 7.2 MG/DL   CBC WITH AUTOMATED DIFF    Collection Time: 12/27/19  4:12 AM   Result Value Ref Range    WBC 7.9 4.6 - 13.2 K/uL    RBC 3.78 (L) 4.20 - 5.30 M/uL HGB 9.8 (L) 12.0 - 16.0 g/dL    HCT 31.8 (L) 35.0 - 45.0 %    MCV 84.1 74.0 - 97.0 FL    MCH 25.9 24.0 - 34.0 PG    MCHC 30.8 (L) 31.0 - 37.0 g/dL    RDW 22.1 (H) 11.6 - 14.5 %    PLATELET 671 902 - 513 K/uL    MPV 10.0 9.2 - 11.8 FL    NEUTROPHILS 67 40 - 73 %    LYMPHOCYTES 24 21 - 52 %    MONOCYTES 8 3 - 10 %    EOSINOPHILS 1 0 - 5 %    BASOPHILS 0 0 - 2 %    ABS. NEUTROPHILS 5.3 1.8 - 8.0 K/UL    ABS. LYMPHOCYTES 1.9 0.9 - 3.6 K/UL    ABS. MONOCYTES 0.7 0.05 - 1.2 K/UL    ABS. EOSINOPHILS 0.1 0.0 - 0.4 K/UL    ABS. BASOPHILS 0.0 0.0 - 0.1 K/UL    DF AUTOMATED     TYPE & SCREEN    Collection Time: 12/27/19  4:12 AM   Result Value Ref Range    Crossmatch Expiration 12/30/2019     ABO/Rh(D) B POSITIVE     Antibody screen NEG     CALLED TO:       UNITS [facility] PHONED AT 0508 BY  [id number]    Unit number [id number]     Blood component type  LR,2     Unit division 00     Status of unit ALLOCATED     Crossmatch result Compatible     Unit number [id number]     Blood component type  LR,1     Unit division 00     Status of unit ALLOCATED     Crossmatch result Compatible    GLUCOSE, POC    Collection Time: 12/27/19 10:52 PM   Result Value Ref Range    Glucose (POC) 104 70 - 110 mg/dL   CBC WITH AUTOMATED DIFF    Collection Time: 12/27/19 11:07 PM   Result Value Ref Range    WBC 12.4 4.6 - 13.2 K/uL    RBC 2.52 (L) 4.20 - 5.30 M/uL    HGB 6.5 (L) 12.0 - 16.0 g/dL    HCT 21.5 (L) 35.0 - 45.0 %    MCV 85.3 74.0 - 97.0 FL    MCH 25.8 24.0 - 34.0 PG    MCHC 30.2 (L) 31.0 - 37.0 g/dL    RDW 22.3 (H) 11.6 - 14.5 %    PLATELET 052 063 - 699 K/uL    MPV 10.3 9.2 - 11.8 FL    NEUTROPHILS 75 (H) 40 - 73 %    LYMPHOCYTES 18 (L) 21 - 52 %    MONOCYTES 7 3 - 10 %    EOSINOPHILS 0 0 - 5 %    BASOPHILS 0 0 - 2 %    ABS. NEUTROPHILS 9.3 (H) 1.8 - 8.0 K/UL    ABS. LYMPHOCYTES 2.2 0.9 - 3.6 K/UL    ABS. MONOCYTES 0.9 0.05 - 1.2 K/UL    ABS. EOSINOPHILS 0.1 0.0 - 0.4 K/UL    ABS.  BASOPHILS 0.0 0.0 - 0.1 K/UL    DF AUTOMATED     METABOLIC PANEL, COMPREHENSIVE    Collection Time: 12/27/19 11:07 PM   Result Value Ref Range    Sodium 138 136 - 145 mmol/L    Potassium 4.0 3.5 - 5.5 mmol/L    Chloride 107 100 - 111 mmol/L    CO2 24 21 - 32 mmol/L    Anion gap 7 3.0 - 18 mmol/L    Glucose 120 (H) 74 - 99 mg/dL    BUN 6 (L) 7.0 - 18 MG/DL    Creatinine 0.82 0.6 - 1.3 MG/DL    BUN/Creatinine ratio 7 (L) 12 - 20      GFR est AA >60 >60 ml/min/1.73m2    GFR est non-AA >60 >60 ml/min/1.73m2    Calcium 7.4 (L) 8.5 - 10.1 MG/DL    Bilirubin, total 0.3 0.2 - 1.0 MG/DL    ALT (SGPT) 31 13 - 56 U/L    AST (SGOT) 43 (H) 10 - 38 U/L    Alk.  phosphatase 94 45 - 117 U/L    Protein, total 4.6 (L) 6.4 - 8.2 g/dL    Albumin 1.6 (L) 3.4 - 5.0 g/dL    Globulin 3.0 2.0 - 4.0 g/dL    A-G Ratio 0.5 (L) 0.8 - 1.7

## 2019-12-28 NOTE — PROGRESS NOTES
0720- Bedside and Verbal shift change report given to Michell Louie RN (oncoming nurse) by Jaylyn Santana RN (offgoing nurse). Report included the following information SBAR, Kardex and MAR.

## 2019-12-28 NOTE — PROGRESS NOTES
Hospitalist Progress Note    Patient: Maria Esther Omer MRN: [id number]  CSN: [id number]    YOB: 1980  Age: 46 y.o. Sex: female    DOA: 2019 LOS:  LOS: 1 day                Assessment and Plan: Active Problems:    Acute on chronic anemia (2019)       premature rupture of membranes (2019)      Hypotension (2019)      H/O blood loss (2019)        Anemia: likely from acute blood loss, her coags and platelets are fine. Agree with another unit of blood transfusion    Hypertension: will watch closely and treat with labetolol if it dosnt improve. Chief complaint:  *Asked to consult for hypotension post op      Subjective:    She feels good, no headache, a little weak but better overall        Review of systems:    General: No fevers or chills. Cardiovascular: No chest pain or pressure. No palpitations. Pulmonary: No shortness of breath. Gastrointestinal: No nausea, vomiting. Objective:    Vital signs/Intake and Output:  Visit Vitals  /73 (BP 1 Location: Right arm, BP Patient Position: At rest)   Pulse 98   Temp 97.9 °F (36.6 °C)   Resp 24   Ht 5' 4\" (1.626 m)   Wt 63.5 kg (140 lb)   SpO2 100%   Breastfeeding Unknown   BMI 24.03 kg/m²     Current Shift:  701 - 1900  In: -   Out: 470   Last three shifts:  1901 - 700  In: 1751.7 [I.V.:1000]  Out: 7002 [Urine:2395]    Physical Exam:  General: NAD, AAOx3. Non-toxic. HEENT: NC/AT. PERRLA, EOMI.  MMM. Lungs: Nml inspection. CTA B/L. No wheezing, rales or rhonchi. Heart:  S1S2 RRR,  PMI mid 5th IC space. No M/RG. Abdomen: Soft, NT/ND.  BS+. No peritoneal signs. Extremities: No C/C/E. Psych:   Nml affect. Neurologic:  2-12 intact. Strength 5/5 throughout.   Sensation symmetrical.          Labs: Results:       Chemistry Recent Labs     19  2307 19  0412   * 82    140   K 4.0 3.8    109   CO2 24 23   BUN 6* 9   CREA 0.82 0.67   CA 7.4* 8.4*   AGAP 7 8   BUCR 7* 13   AP 94 153*   TP 4.6* 6.9   ALB 1.6* 2.3*   GLOB 3.0 4.6*   AGRAT 0.5* 0.5*      CBC w/Diff Recent Labs     12/28/19  1400 12/28/19  0901 12/28/19  0545   WBC 16.3* 12.2 12.6   RBC 2.51* 2.77* 2.85*   HGB 6.7* 7.3* 7.6*   HCT 20.7* 22.9* 23.9*    167 155   GRANS 80* 82* 81*   LYMPH 12* 13* 13*   EOS 0 0 0      Cardiac Enzymes No results for input(s): CPK, CKND1, KAREN in the last 72 hours. No lab exists for component: CKRMB, TROIP   Coagulation Recent Labs     12/28/19  1400   PTP 13.4   INR 1.0       Lipid Panel No results found for: CHOL, CHOLPOCT, CHOLX, CHLST, CHOLV, 589930, HDL, HDLP, LDL, LDLC, DLDLP, 346692, VLDLC, VLDL, TGLX, TRIGL, TRIGP, TGLPOCT, CHHD, CHHDX   BNP No results for input(s): BNPP in the last 72 hours.    Liver Enzymes Recent Labs     12/27/19  2307   TP 4.6*   ALB 1.6*   AP 94   SGOT 43*      Thyroid Studies No results found for: T4, T3U, TSH, TSHEXT     Procedures/imaging: see electronic medical records for all procedures/Xrays and details which were not copied into this note but were reviewed prior to creation of Plan

## 2019-12-28 NOTE — PROGRESS NOTES
Post-Operative  Day 1    Easton Espinosa     Assessment:   Hospital Problems  Date Reviewed: 2019          Codes Class Noted POA    Hypotension ICD-10-CM: I95.9  ICD-9-CM: 458.9  2019 No        H/O blood loss ICD-10-CM: Z87.898  ICD-9-CM: V12.59  2019 No         premature rupture of membranes ICD-10-CM: O42.919  ICD-9-CM: 658.10  2019 Unknown        Acute on chronic anemia ICD-10-CM: D64.9  ICD-9-CM: 285.9  2019 No            Post-Op day 1, stable    Plan:   1. Routine post-operative care   2. IVF bolus   3. Hemabate IM and IV TXA given - uterus firm after meds. Minimal lochia after clots expressed from  the vagina. 4.  Montior vital signs   5. STAT CBC and coags ordered. Will most likely need at least another unit of blood. Information for the patient's :  Waltertorrey Cain [[id number]]   , Low Transverse   Patient complains of dizziness, nausea and rectal pressure. Passed a golf ball size clot and saturated a pad.     Current Facility-Administered Medications   Medication Dose Route Frequency    tranexamic acid (CYKLOKAPRON) 1,000 mg/10 mL (100 mg/mL) injection        ketorolac (TORADOL) injection 30 mg  30 mg IntraVENous Q6H    methylergonovine (METHERGINE) 0.2 mg/mL (1 mL) injection 0.2 mg  0.2 mg IntraMUSCular ONCE    lactated Ringers infusion  125 mL/hr IntraVENous CONTINUOUS    oxytocin (PITOCIN) 20 units/1000 mL in 0.9% sodium chloride  125 mL/hr IntraVENous CONTINUOUS    sodium chloride (NS) flush 5-40 mL  5-40 mL IntraVENous Q8H    fluticasone propionate (FLONASE) 50 mcg/actuation nasal spray 2 Spray  2 Spray Both Nostrils DAILY        Vitals:  Visit Vitals  BP (!) 156/98 (BP 1 Location: Left arm, BP Patient Position: At rest)   Pulse 97   Temp 99.4 °F (37.4 °C)   Resp 24   Ht 5' 4\" (1.626 m)   Wt 63.5 kg (140 lb)   SpO2 99%   Breastfeeding Unknown   BMI 24.03 kg/m²     Temp (24hrs), Av.5 °F (36.9 °C), Min:97.4 °F (36.3 °C), Max:99.4 °F (37.4 °C)      Last 24hr Input/Output:    Intake/Output Summary (Last 24 hours) at 12/28/2019 1442  Last data filed at 12/28/2019 1330  Gross per 24 hour   Intake 751.7 ml   Output 3909 ml   Net -3157.3 ml          Exam:        Patient without distress. Abdomen  soft, expected tenderness, fundus not easily palpable. Wound clean, dry and intact     Perineum + golf ball size blood clots and trickle of blood               Lower extremities are negative for swelling, cords or tenderness. On bimanual exam, there was a large amount of clot in the vagina which was manually removed. Minimal lochia after expressing the clot. Pt reports rectal pressure relieved. Labs:   Lab Results   Component Value Date/Time    WBC 12.2 12/28/2019 09:01 AM    WBC 12.6 12/28/2019 05:45 AM    WBC 12.4 12/27/2019 11:07 PM    HGB 7.3 (L) 12/28/2019 09:01 AM    HGB 7.6 (L) 12/28/2019 05:45 AM    HGB 6.5 (L) 12/27/2019 11:07 PM    HCT 22.9 (L) 12/28/2019 09:01 AM    HCT 23.9 (L) 12/28/2019 05:45 AM    HCT 21.5 (L) 12/27/2019 11:07 PM    PLATELET 563 58/54/0431 09:01 AM    PLATELET 571 79/88/6768 05:45 AM    PLATELET 733 36/17/0406 11:07 PM       Recent Results (from the past 24 hour(s))   GLUCOSE, POC    Collection Time: 12/27/19 10:52 PM   Result Value Ref Range    Glucose (POC) 104 70 - 110 mg/dL   CBC WITH AUTOMATED DIFF    Collection Time: 12/27/19 11:07 PM   Result Value Ref Range    WBC 12.4 4.6 - 13.2 K/uL    RBC 2.52 (L) 4.20 - 5.30 M/uL    HGB 6.5 (L) 12.0 - 16.0 g/dL    HCT 21.5 (L) 35.0 - 45.0 %    MCV 85.3 74.0 - 97.0 FL    MCH 25.8 24.0 - 34.0 PG    MCHC 30.2 (L) 31.0 - 37.0 g/dL    RDW 22.3 (H) 11.6 - 14.5 %    PLATELET 416 509 - 215 K/uL    MPV 10.3 9.2 - 11.8 FL    NEUTROPHILS 75 (H) 40 - 73 %    LYMPHOCYTES 18 (L) 21 - 52 %    MONOCYTES 7 3 - 10 %    EOSINOPHILS 0 0 - 5 %    BASOPHILS 0 0 - 2 %    ABS. NEUTROPHILS 9.3 (H) 1.8 - 8.0 K/UL    ABS.  LYMPHOCYTES 2.2 0.9 - 3.6 K/UL ABS. MONOCYTES 0.9 0.05 - 1.2 K/UL    ABS. EOSINOPHILS 0.1 0.0 - 0.4 K/UL    ABS. BASOPHILS 0.0 0.0 - 0.1 K/UL    DF AUTOMATED     METABOLIC PANEL, COMPREHENSIVE    Collection Time: 12/27/19 11:07 PM   Result Value Ref Range    Sodium 138 136 - 145 mmol/L    Potassium 4.0 3.5 - 5.5 mmol/L    Chloride 107 100 - 111 mmol/L    CO2 24 21 - 32 mmol/L    Anion gap 7 3.0 - 18 mmol/L    Glucose 120 (H) 74 - 99 mg/dL    BUN 6 (L) 7.0 - 18 MG/DL    Creatinine 0.82 0.6 - 1.3 MG/DL    BUN/Creatinine ratio 7 (L) 12 - 20      GFR est AA >60 >60 ml/min/1.73m2    GFR est non-AA >60 >60 ml/min/1.73m2    Calcium 7.4 (L) 8.5 - 10.1 MG/DL    Bilirubin, total 0.3 0.2 - 1.0 MG/DL    ALT (SGPT) 31 13 - 56 U/L    AST (SGOT) 43 (H) 10 - 38 U/L    Alk. phosphatase 94 45 - 117 U/L    Protein, total 4.6 (L) 6.4 - 8.2 g/dL    Albumin 1.6 (L) 3.4 - 5.0 g/dL    Globulin 3.0 2.0 - 4.0 g/dL    A-G Ratio 0.5 (L) 0.8 - 1.7     CBC WITH AUTOMATED DIFF    Collection Time: 12/28/19  5:45 AM   Result Value Ref Range    WBC 12.6 4.6 - 13.2 K/uL    RBC 2.85 (L) 4.20 - 5.30 M/uL    HGB 7.6 (L) 12.0 - 16.0 g/dL    HCT 23.9 (L) 35.0 - 45.0 %    MCV 83.9 74.0 - 97.0 FL    MCH 26.7 24.0 - 34.0 PG    MCHC 31.8 31.0 - 37.0 g/dL    RDW 20.2 (H) 11.6 - 14.5 %    PLATELET 389 124 - 924 K/uL    MPV 10.1 9.2 - 11.8 FL    NEUTROPHILS 81 (H) 40 - 73 %    LYMPHOCYTES 13 (L) 21 - 52 %    MONOCYTES 6 3 - 10 %    EOSINOPHILS 0 0 - 5 %    BASOPHILS 0 0 - 2 %    ABS. NEUTROPHILS 10.2 (H) 1.8 - 8.0 K/UL    ABS. LYMPHOCYTES 1.6 0.9 - 3.6 K/UL    ABS. MONOCYTES 0.7 0.05 - 1.2 K/UL    ABS. EOSINOPHILS 0.0 0.0 - 0.4 K/UL    ABS.  BASOPHILS 0.0 0.0 - 0.1 K/UL    DF AUTOMATED     CBC WITH AUTOMATED DIFF    Collection Time: 12/28/19  9:01 AM   Result Value Ref Range    WBC 12.2 4.6 - 13.2 K/uL    RBC 2.77 (L) 4.20 - 5.30 M/uL    HGB 7.3 (L) 12.0 - 16.0 g/dL    HCT 22.9 (L) 35.0 - 45.0 %    MCV 82.7 74.0 - 97.0 FL    MCH 26.4 24.0 - 34.0 PG    MCHC 31.9 31.0 - 37.0 g/dL    RDW 20.0 (H) 11.6 - 14.5 %    PLATELET 302 100 - 312 K/uL    MPV 10.4 9.2 - 11.8 FL    NEUTROPHILS 82 (H) 40 - 73 %    LYMPHOCYTES 13 (L) 21 - 52 %    MONOCYTES 5 3 - 10 %    EOSINOPHILS 0 0 - 5 %    BASOPHILS 0 0 - 2 %    ABS. NEUTROPHILS 9.9 (H) 1.8 - 8.0 K/UL    ABS. LYMPHOCYTES 1.6 0.9 - 3.6 K/UL    ABS. MONOCYTES 0.6 0.05 - 1.2 K/UL    ABS. EOSINOPHILS 0.1 0.0 - 0.4 K/UL    ABS.  BASOPHILS 0.0 0.0 - 0.1 K/UL    DF AUTOMATED

## 2019-12-28 NOTE — PROGRESS NOTES
Bedside and Verbal shift change report given to LEONARDO Ford RN (oncoming nurse) by Nelly Whitehead RN (offgoing nurse). Report given with SBAR, Kardex, MAR and Recent Results.

## 2019-12-28 NOTE — PROGRESS NOTES
2019  11:48 AM    Anesthesia [facility] Post-Op Rounding Note    Referring physician: Tex Campos MD   Patient status post Procedure(s):   SECTION on 2019      Visit Vitals  /82   Pulse (!) 101   Temp 37.1 °C (98.8 °F)   Resp 24   Ht 5' 4\" (1.626 m)   Wt 63.5 kg (140 lb)   SpO2 100%   Breastfeeding Unknown   BMI 24.03 kg/m²       Patient states pain controlled. Pruritis is  resolved. No sedation or nausea noted. Patient ambulating without difficulty. No complications, adequate analgesia. Continue current postop pain regimen.

## 2019-12-28 NOTE — PROGRESS NOTES
1618 Fundus firm at U, small lochia rubra, no clots present. VSS. Ambulated pt to restroom. Pt void x1 at this time. Iris care complete. Pad change. Ambulated pt back to bed. Fundus firm at U. Pt IV infiltrated, cool to the touch, fluids paused at this time. Nurse notified. Pt in bed at this time. No needs expressed. Bed in lowest position, call bell in reach. Temp Pulse Resp BP SpO2           12/28/19 1618 98.2 °F (36.8 °C) 99 18 133/88 100 %                               1830 Per R Marlyn RN pt complaint of nausea, light headedness, and pressure in bottom that pt felt earlier in the day before clots passed. Nurse requested Aimee Mcgrath to be notified. CNM notified at this time of pt condition. Per CNM this CNM will notify Oliver Tang to come see pt and call this RN if Oliver Tang is unable to come at this moment. Nurse notified.

## 2019-12-28 NOTE — ROUTINE PROCESS
2259- Responded to call for RRT. Dr. Buster Wisdom at bedside. Assisted with transferring Pt from toilet to bed. Obtained stat blood draw. Dr. Buster Wisdom placed orders for continuation of care on Mother Baby unit. No Pt transfer at this time. RRT concluded. 0115- Followed up post RRT. Pt resting in bed with no signs of distress, RN at bedside. Blood transfusion in process. RN reported stable BP.

## 2019-12-29 LAB
ANION GAP SERPL CALC-SCNC: 4 MMOL/L (ref 3–18)
ANION GAP SERPL CALC-SCNC: 5 MMOL/L (ref 3–18)
BASOPHILS # BLD: 0 K/UL (ref 0–0.1)
BASOPHILS NFR BLD: 0 % (ref 0–2)
BASOPHILS NFR BLD: 0 % (ref 0–3)
BASOPHILS NFR BLD: 0 % (ref 0–3)
BUN SERPL-MCNC: 6 MG/DL (ref 7–18)
BUN SERPL-MCNC: 6 MG/DL (ref 7–18)
BUN/CREAT SERPL: 10 (ref 12–20)
BUN/CREAT SERPL: 9 (ref 12–20)
CALCIUM SERPL-MCNC: 6.6 MG/DL (ref 8.5–10.1)
CALCIUM SERPL-MCNC: 6.8 MG/DL (ref 8.5–10.1)
CHLORIDE SERPL-SCNC: 106 MMOL/L (ref 100–111)
CHLORIDE SERPL-SCNC: 108 MMOL/L (ref 100–111)
CO2 SERPL-SCNC: 24 MMOL/L (ref 21–32)
CO2 SERPL-SCNC: 25 MMOL/L (ref 21–32)
CREAT SERPL-MCNC: 0.63 MG/DL (ref 0.6–1.3)
CREAT SERPL-MCNC: 0.65 MG/DL (ref 0.6–1.3)
DIFFERENTIAL METHOD BLD: ABNORMAL
EOSINOPHIL # BLD: 0 K/UL (ref 0–0.4)
EOSINOPHIL NFR BLD: 0 % (ref 0–5)
ERYTHROCYTE [DISTWIDTH] IN BLOOD BY AUTOMATED COUNT: 16.4 % (ref 11.6–14.5)
ERYTHROCYTE [DISTWIDTH] IN BLOOD BY AUTOMATED COUNT: 16.5 % (ref 11.6–14.5)
ERYTHROCYTE [DISTWIDTH] IN BLOOD BY AUTOMATED COUNT: 18.3 % (ref 11.6–14.5)
GLUCOSE SERPL-MCNC: 100 MG/DL (ref 74–99)
GLUCOSE SERPL-MCNC: 92 MG/DL (ref 74–99)
HCT VFR BLD AUTO: 19.9 % (ref 35–45)
HCT VFR BLD AUTO: 20.5 % (ref 35–45)
HCT VFR BLD AUTO: 20.8 % (ref 35–45)
HCT VFR BLD AUTO: 25.6 % (ref 35–45)
HGB BLD-MCNC: 6.7 G/DL (ref 12–16)
HGB BLD-MCNC: 6.7 G/DL (ref 12–16)
HGB BLD-MCNC: 7 G/DL (ref 12–16)
HGB BLD-MCNC: 8.7 G/DL (ref 12–16)
LYMPHOCYTES # BLD: 1.1 K/UL (ref 0.8–3.5)
LYMPHOCYTES # BLD: 1.1 K/UL (ref 0.9–3.6)
LYMPHOCYTES # BLD: 1.4 K/UL (ref 0.8–3.5)
LYMPHOCYTES NFR BLD: 5 % (ref 20–51)
LYMPHOCYTES NFR BLD: 6 % (ref 20–51)
LYMPHOCYTES NFR BLD: 6 % (ref 21–52)
MCH RBC QN AUTO: 27.3 PG (ref 24–34)
MCH RBC QN AUTO: 27.9 PG (ref 24–34)
MCH RBC QN AUTO: 29 PG (ref 24–34)
MCHC RBC AUTO-ENTMCNC: 32.7 G/DL (ref 31–37)
MCHC RBC AUTO-ENTMCNC: 33.7 G/DL (ref 31–37)
MCHC RBC AUTO-ENTMCNC: 33.7 G/DL (ref 31–37)
MCV RBC AUTO: 82.9 FL (ref 74–97)
MCV RBC AUTO: 83.7 FL (ref 74–97)
MCV RBC AUTO: 86.3 FL (ref 74–97)
MONOCYTES # BLD: 0.2 K/UL (ref 0–1)
MONOCYTES # BLD: 0.7 K/UL (ref 0–1)
MONOCYTES # BLD: 0.9 K/UL (ref 0.05–1.2)
MONOCYTES NFR BLD: 1 % (ref 2–9)
MONOCYTES NFR BLD: 3 % (ref 2–9)
MONOCYTES NFR BLD: 5 % (ref 3–10)
NEUTS BAND NFR BLD MANUAL: 7 % (ref 0–5)
NEUTS BAND NFR BLD MANUAL: 8 % (ref 0–5)
NEUTS SEG # BLD: 17.2 K/UL (ref 1.8–8)
NEUTS SEG # BLD: 18.4 K/UL (ref 1.8–8)
NEUTS SEG # BLD: 19.5 K/UL (ref 1.8–8)
NEUTS SEG NFR BLD: 85 % (ref 42–75)
NEUTS SEG NFR BLD: 85 % (ref 42–75)
NEUTS SEG NFR BLD: 89 % (ref 40–73)
PLATELET # BLD AUTO: 137 K/UL (ref 135–420)
PLATELET # BLD AUTO: 142 K/UL (ref 135–420)
PLATELET # BLD AUTO: 161 K/UL (ref 135–420)
PMV BLD AUTO: 10 FL (ref 9.2–11.8)
PMV BLD AUTO: 10.9 FL (ref 9.2–11.8)
PMV BLD AUTO: 9.8 FL (ref 9.2–11.8)
POTASSIUM SERPL-SCNC: 4 MMOL/L (ref 3.5–5.5)
POTASSIUM SERPL-SCNC: 4 MMOL/L (ref 3.5–5.5)
RBC # BLD AUTO: 2.4 M/UL (ref 4.2–5.3)
RBC # BLD AUTO: 2.41 M/UL (ref 4.2–5.3)
RBC # BLD AUTO: 2.45 M/UL (ref 4.2–5.3)
RBC MORPH BLD: ABNORMAL
SODIUM SERPL-SCNC: 136 MMOL/L (ref 136–145)
SODIUM SERPL-SCNC: 136 MMOL/L (ref 136–145)
WBC # BLD AUTO: 19.2 K/UL (ref 4.6–13.2)
WBC # BLD AUTO: 21.7 K/UL (ref 4.6–13.2)
WBC # BLD AUTO: 22.9 K/UL (ref 4.6–13.2)

## 2019-12-29 PROCEDURE — 74011250636 HC RX REV CODE- 250/636: Performed by: ADVANCED PRACTICE MIDWIFE

## 2019-12-29 PROCEDURE — 74011250637 HC RX REV CODE- 250/637: Performed by: MIDWIFE

## 2019-12-29 PROCEDURE — 80048 BASIC METABOLIC PNL TOTAL CA: CPT

## 2019-12-29 PROCEDURE — 36415 COLL VENOUS BLD VENIPUNCTURE: CPT

## 2019-12-29 PROCEDURE — P9016 RBC LEUKOCYTES REDUCED: HCPCS

## 2019-12-29 PROCEDURE — 74011250636 HC RX REV CODE- 250/636: Performed by: OBSTETRICS & GYNECOLOGY

## 2019-12-29 PROCEDURE — 65270000029 HC RM PRIVATE

## 2019-12-29 PROCEDURE — 74011000258 HC RX REV CODE- 258: Performed by: OBSTETRICS & GYNECOLOGY

## 2019-12-29 PROCEDURE — 74011250637 HC RX REV CODE- 250/637: Performed by: OBSTETRICS & GYNECOLOGY

## 2019-12-29 PROCEDURE — 85018 HEMOGLOBIN: CPT

## 2019-12-29 PROCEDURE — 36430 TRANSFUSION BLD/BLD COMPNT: CPT

## 2019-12-29 PROCEDURE — 85025 COMPLETE CBC W/AUTO DIFF WBC: CPT

## 2019-12-29 PROCEDURE — 77030040831 HC BAG URINE DRNG MDII -A

## 2019-12-29 RX ORDER — DOCUSATE SODIUM 100 MG/1
100 CAPSULE, LIQUID FILLED ORAL
Status: DISCONTINUED | OUTPATIENT
Start: 2019-12-29 | End: 2019-12-31 | Stop reason: HOSPADM

## 2019-12-29 RX ORDER — SODIUM CHLORIDE 9 MG/ML
250 INJECTION, SOLUTION INTRAVENOUS AS NEEDED
Status: DISCONTINUED | OUTPATIENT
Start: 2019-12-29 | End: 2019-12-31

## 2019-12-29 RX ADMIN — SODIUM CHLORIDE, SODIUM LACTATE, POTASSIUM CHLORIDE, AND CALCIUM CHLORIDE 125 ML/HR: 600; 310; 30; 20 INJECTION, SOLUTION INTRAVENOUS at 07:28

## 2019-12-29 RX ADMIN — OXYCODONE HYDROCHLORIDE AND ACETAMINOPHEN 1 TABLET: 5; 325 TABLET ORAL at 09:23

## 2019-12-29 RX ADMIN — CLINDAMYCIN PHOSPHATE 900 MG: 900 INJECTION, SOLUTION INTRAVENOUS at 07:28

## 2019-12-29 RX ADMIN — OXYCODONE HYDROCHLORIDE AND ACETAMINOPHEN 2 TABLET: 5; 325 TABLET ORAL at 03:26

## 2019-12-29 RX ADMIN — SODIUM CHLORIDE, SODIUM LACTATE, POTASSIUM CHLORIDE, AND CALCIUM CHLORIDE 125 ML/HR: 600; 310; 30; 20 INJECTION, SOLUTION INTRAVENOUS at 07:30

## 2019-12-29 RX ADMIN — DOCUSATE SODIUM 100 MG: 100 CAPSULE, LIQUID FILLED ORAL at 09:23

## 2019-12-29 RX ADMIN — GENTAMICIN SULFATE 270 MG: 40 INJECTION, SOLUTION INTRAMUSCULAR; INTRAVENOUS at 23:03

## 2019-12-29 RX ADMIN — CLINDAMYCIN PHOSPHATE 900 MG: 900 INJECTION, SOLUTION INTRAVENOUS at 22:02

## 2019-12-29 RX ADMIN — OXYCODONE HYDROCHLORIDE AND ACETAMINOPHEN 1 TABLET: 5; 325 TABLET ORAL at 13:38

## 2019-12-29 RX ADMIN — CLINDAMYCIN PHOSPHATE 900 MG: 900 INJECTION, SOLUTION INTRAVENOUS at 13:39

## 2019-12-29 RX ADMIN — SODIUM CHLORIDE 250 ML: 900 INJECTION, SOLUTION INTRAVENOUS at 10:44

## 2019-12-29 RX ADMIN — OXYCODONE HYDROCHLORIDE AND ACETAMINOPHEN 2 TABLET: 5; 325 TABLET ORAL at 20:52

## 2019-12-29 NOTE — PROGRESS NOTES
Baby in NICU. Plan to increase activity slowly to see how patient tolerates. If tolerated well, plan to bring patient to NICU to visit with infant.

## 2019-12-29 NOTE — OP NOTES
Outpatient Operative Note     Surgeon(s): Katerin Roblero MD  Assistant:  none  Pre-operative Diagnosis: Postpartum Hemorrhage, Uterine atony, Status post repeat  section  Post-operative Diagnosis: same as preop diagnosis. Procedure(s) Performed: Suction Dilation & Curettage, Transabdominal Ultrasound                                     Anesthesia: General with LMA  Findings: 400 ml of blood clot expressed from the uterus prior to D&C. Small amount of products of conception which appeared to be a small fetus  Complications: none   Estimated Blood Loss: 30 cc from procedure  Specimens: products of conception  Implants: None  Procedure: The patient was taken to the operating room where she was placed in the supine position. After general anesthesia was initiated, she was placed in the dorsal lithotomy position. The patient was then prepped and draped in the usual fashion. Time out was completed and all were in aggreement. Attention was first turned to the vagina where the speculum was placed. A large amount of clot was removed from the vagina and cervix. The anterior lip of the cervix was grasped with a ring forceps. A 14 Arabic suction curet was passed multiple times with transabdominal ultrasound guidance. A sharp curet was then used to gently assess the cavity. A final pass with the suction curet was performed. All instruments were removed from the vagina. Bimanual massage was performed and the uterus was noted to be firm. A small laceration of the cervix was repaired with 0 vicryl suture. Good hemostasis was noted. All sponge counts were correct. The patient was awakened and taken to the recovery room in stable condition.       Katerin Roblero MD

## 2019-12-29 NOTE — ANESTHESIA POSTPROCEDURE EVALUATION
Post-Anesthesia Evaluation and Assessment    Patient: Renan Seen MRN: [id number]  SSN: [id number]   YOB: 1980  Age: 46 y.o. Sex: female      Cardiovascular Function/Vital Signs  /90   Pulse 100   Temp 36.3 °C (97.3 °F)   Resp 18   Ht 5' 4\" (1.626 m)   Wt 63.5 kg (140 lb)   SpO2 100%   Breastfeeding Unknown   BMI 24.03 kg/m²     Patient is status post Procedure(s):  DILATATION AND CURETTAGE WITH SUCTION. Nausea/Vomiting: Controlled. Postoperative hydration reviewed and adequate. Pain:  Pain Scale 1: Visual (12/28/19 2133)  Pain Intensity 1: 0 (12/28/19 2133)   Managed. Neurological Status:   Neuro (WDL): Within Defined Limits (12/27/19 0720)   At baseline. Mental Status and Level of Consciousness: Arousable. Pulmonary Status:   O2 Device: Non-rebreather mask;Oral airway (12/28/19 2133)   Adequate oxygenation and airway patent. Complications related to anesthesia: None    Post-anesthesia assessment completed. No concerns. Patient has met all discharge requirements. Signed By: Roseanne De Leon CRNA    December 28, 2019             Procedure(s):  DILATATION AND CURETTAGE WITH SUCTION. general    <BSHSIANPOST>    Vitals Value Taken Time   /90 12/28/2019  9:45 PM   Temp 36.3 °C (97.3 °F) 12/28/2019  9:33 PM   Pulse 91 12/28/2019  9:47 PM   Resp 18 12/28/2019  9:47 PM   SpO2 100 % 12/28/2019  9:47 PM   Vitals shown include unvalidated device data.

## 2019-12-29 NOTE — ROUTINE PROCESS
TRANSFER - IN REPORT:    Verbal report received from [address] [place], [place] [address] Christiane Cowart  being received fromPost partum for transfer of care      Report consisted of patients Situation, Background, Assessment and   Recommendations(SBAR). Information from the following report(s)  was reviewed with the receiving nurse. Opportunity for questions and clarification was provided. Assessment completed upon patients arrival to unit and care assumed.

## 2019-12-29 NOTE — PERIOP NOTES
TRANSFER - OUT REPORT:    Verbal report given to James Myers (name) on Oscar You  being transferred to L&D (unit) for routine post - op       Report consisted of patients Situation, Background, Assessment and   Recommendations(SBAR). Information from the following report(s) SBAR, Kardex, Intake/Output, MAR, Recent Results and Cardiac Rhythm NSR was reviewed with the receiving nurse. Lines:   Peripheral IV 12/27/19 Left Hand (Active)   Site Assessment Clean, dry, & intact 12/28/2019  9:56 PM   Phlebitis Assessment 0 12/28/2019  9:56 PM   Infiltration Assessment 0 12/28/2019  9:56 PM   Dressing Status Clean, dry, & intact 12/28/2019  9:56 PM   Dressing Type Transparent 12/28/2019  9:56 PM   Hub Color/Line Status Infusing 12/28/2019  9:56 PM   Action Taken Open ports on tubing capped 12/27/2019  9:10 PM   Alcohol Cap Used Yes 12/27/2019  9:10 PM        Opportunity for questions and clarification was provided.       Patient transported with:   O2 @ 300 liters

## 2019-12-29 NOTE — PROGRESS NOTES
Progress note    Called to evaluate pt for bleeding. Pt c/o dizziness and rectal pressure similar to what she experienced earlier in the day. She was first evaluated by Gina Martinez. Large clots expressed. Uterus was initially boggy and then firm after massage and expressed clots. Receiving an additional 1 unit of PRBC. IV pitocin started. Discussed with the patient and family member need for further exploration in the OR. Plan to proceed with suction D&C with possible Bakri balloon placement. Consent signed.         Aidan Purcell MD

## 2019-12-29 NOTE — PROGRESS NOTES
Transferred Pt to [facility] at bedside. Dressing CDI. Left pt stable.      12/28/19 2207   Vital Signs   Temp 97.3 °F (36.3 °C)   Temp Source Oral   Pulse (Heart Rate) 95   Resp Rate 20   /81   Oxygen Therapy   O2 Sat (%) 100 %

## 2019-12-29 NOTE — PROGRESS NOTES
Antimicrobial Stewardship Team Note    [employer] [facility] Antimicrobial Recommendation    Patient: Klaudia Corcoran  MRN#: [id number]  Attending: No name on file. Admission Date: [id number]    Current Antimicrobial Medications  Current Antimicrobial Therapy (168h ago, onward)      Ordered     Start Stop    12/28/19 2139  gentamicin (GARAMYCIN) 270 mg in 0.9% sodium chloride 100 mL IVPB  270 mg,   IntraVENous,   EVERY 24 HOURS      12/28/19 2200 --    12/28/19 2139  clindamycin (CLEOCIN) 900mg NS 50mL IVPB (premix)  900 mg,   IntraVENous,   EVERY 8 HOURS      12/28/19 2200 --    12/28/19 2028  ceFAZolin (ANCEF) 2 g/20 mL in sterile water IV syringe  2 g,   IntraVENous,   ONCE      12/28/19 2100 12/29/19 0859              Current Indication/Therapy  Gentamicin dosed extended dosing (based on  5 mg/kg) ideal body weight(54.7 mg)    Assessment/Recommendation  EIAD typically employs a daily dose of 7 mg/kg (5 mg/kg/day for UTI is  reasonable) and is usually dosed q24h in patients with normal renal function. If patient remains on gentamicin regimen longer than 24 hrs a Random serum level 6-14 hours AFTER THE START of the infusion of the next dose  dose to confirm appropriate serum level. - Confirm an appropriate serum concentration after dosage adjustment.       Submitted by: Unique Hines [facility]

## 2019-12-29 NOTE — PROGRESS NOTES
2215 Patient arrives to unit from PACU.    2352 Cheyenne Peña CNM aware of H&H results. Consulting MD regarding additional units of blood. 0006 Orders received to transfuse 1 unit of PRBC by Milo Reyna. Kimberly Omer MD updated on patient and orders to infused from [facility].     3120 Transfusion started. 0111 Infusion rate changed to 125 mL/hr.    0251 Spoke to GRETTA Reyna. Pitocin to be stopped after current bag has infused. 125 mL continuous lactated ringers. Repeat H&H 4 hours post transfusion. 7346 Transfusion complete. Patient sleeping.    0755 Bedside shift change report given to Clerance Dancer, RN (oncoming nurse) by Bethel Pulido RN (offgoing nurse). Report included the following information SBAR, Kardex, Intake/Output, MAR and Recent Results.

## 2019-12-29 NOTE — PROGRESS NOTES
Report received from [facility]. [place] DESHAUN Stack. Dr. Latoya Barclay on unit and awaiting OR team for [facility]  and ultrasound. Pt. Postpartum hemorrhage. BBS clear, Fundus now firm at U with small rubra. RBC's infusing and verified. IVF fluids infusing as ordered. Pt. Mother at bedside. Pt. And family deny questions. 2015 Bedside and Verbal shift change report given to ELIE Jefferson RN (oncoming nurse) by CHI Rodrigues RN (offgoing nurse). Report given with Abraham ECHEVARRIA and MAR.

## 2019-12-29 NOTE — PROGRESS NOTES
IV has been saline locked. Patient has been up to bathroom x2 prior to parson catheter being discontinued. Vaginal lochia scant. Uterine fundus firm at 1 below umbilicus. Tolerating diet.

## 2019-12-29 NOTE — PROGRESS NOTES
Post-Operative  Day 2    [facility]     Assessment:   Hospital Problems  Date Reviewed: 2019          Codes Class Noted POA    Hypotension ICD-10-CM: I95.9  ICD-9-CM: 458.9  2019 No        H/O blood loss ICD-10-CM: Z87.898  ICD-9-CM: V12.59  2019 No        Postpartum hemorrhage ICD-10-CM: O72.1  ICD-9-CM: 666.10  2019 Yes         premature rupture of membranes ICD-10-CM: O42.919  ICD-9-CM: 658.10  2019 Unknown        Acute on chronic anemia ICD-10-CM: D64.9  ICD-9-CM: 285.9  2019 No            Post-Op day 2  section, Post-op day 1 D&C.      doing well. Plan:   1. Routine post-operative care  2. Transfer to postpartum  3. Out of bed to chair and ambulate if stable  4. Monitor lochia    Information for the patient's :  Franky Blair [[id number]]   , Low Transverse   Patient doing well without significant complaint. Nausea and vomiting resolved. Tolerating liquids. Rectal pressure resolved.  Minimal lochia  Current Facility-Administered Medications   Medication Dose Route Frequency    ketorolac (TORADOL) injection 30 mg  30 mg IntraVENous Q6H    lactated Ringers infusion  125 mL/hr IntraVENous CONTINUOUS    ceFAZolin (ANCEF) 2 g/20 mL in sterile water IV syringe  2 g IntraVENous ONCE    gentamicin (GARAMYCIN) 270 mg in 0.9% sodium chloride 100 mL IVPB  270 mg IntraVENous Q24H    clindamycin (CLEOCIN) 900mg NS 50mL IVPB (premix)  900 mg IntraVENous Q8H    oxytocin (PITOCIN) 20 units/1000 mL in 0.9% sodium chloride  125 mL/hr IntraVENous CONTINUOUS    sodium chloride (NS) flush 5-40 mL  5-40 mL IntraVENous Q8H    fluticasone propionate (FLONASE) 50 mcg/actuation nasal spray 2 Spray  2 Spray Both Nostrils DAILY       Vitals:  Visit Vitals  /80   Pulse 90   Temp 98.2 °F (36.8 °C)   Resp 16   Ht 5' 4\" (1.626 m)   Wt 63.5 kg (140 lb)   SpO2 100%   Breastfeeding Unknown   BMI 24.03 kg/m²     Temp (24hrs), Av.2 °F (36.8 °C), Min:97.3 °F (36.3 °C), Max:99.4 °F (37.4 °C)        Exam:      Patient without distress. Abdomen, bowel sounds present, soft, expected tenderness, fundus firm    Wound incision clean, dry and intact               Lower extremities are negative for swelling, cords or tenderness. Labs:   Lab Results   Component Value Date/Time    WBC 19.2 (H) 2019 07:35 AM    WBC 21.7 (H) 2019 04:50 AM    WBC 22.9 (H) 2019 11:28 PM    HGB 6.7 (L) 2019 07:35 AM    HGB 7.0 (L) 2019 04:50 AM    HGB 6.7 (L) 2019 11:28 PM    HCT 19.9 (L) 2019 07:35 AM    HCT 20.8 (L) 2019 04:50 AM    HCT 20.5 (L) 2019 11:28 PM    PLATELET 072  07:35 AM    PLATELET 803  04:50 AM    PLATELET 182  11:28 PM       Recent Results (from the past 24 hour(s))   CBC WITH AUTOMATED DIFF    Collection Time: 19  9:01 AM   Result Value Ref Range    WBC 12.2 4.6 - 13.2 K/uL    RBC 2.77 (L) 4.20 - 5.30 M/uL    HGB 7.3 (L) 12.0 - 16.0 g/dL    HCT 22.9 (L) 35.0 - 45.0 %    MCV 82.7 74.0 - 97.0 FL    MCH 26.4 24.0 - 34.0 PG    MCHC 31.9 31.0 - 37.0 g/dL    RDW 20.0 (H) 11.6 - 14.5 %    PLATELET 504 229 - 238 K/uL    MPV 10.4 9.2 - 11.8 FL    NEUTROPHILS 82 (H) 40 - 73 %    LYMPHOCYTES 13 (L) 21 - 52 %    MONOCYTES 5 3 - 10 %    EOSINOPHILS 0 0 - 5 %    BASOPHILS 0 0 - 2 %    ABS. NEUTROPHILS 9.9 (H) 1.8 - 8.0 K/UL    ABS. LYMPHOCYTES 1.6 0.9 - 3.6 K/UL    ABS. MONOCYTES 0.6 0.05 - 1.2 K/UL    ABS. EOSINOPHILS 0.1 0.0 - 0.4 K/UL    ABS.  BASOPHILS 0.0 0.0 - 0.1 K/UL    DF AUTOMATED     CBC WITH AUTOMATED DIFF    Collection Time: 19  2:00 PM   Result Value Ref Range    WBC 16.3 (H) 4.6 - 13.2 K/uL    RBC 2.51 (L) 4.20 - 5.30 M/uL    HGB 6.7 (L) 12.0 - 16.0 g/dL    HCT 20.7 (L) 35.0 - 45.0 %    MCV 82.5 74.0 - 97.0 FL    MCH 26.7 24.0 - 34.0 PG    MCHC 32.4 31.0 - 37.0 g/dL    RDW 20.7 (H) 11.6 - 14.5 %    PLATELET 639 129 - 112 K/uL MPV 10.8 9.2 - 11.8 FL    NEUTROPHILS 80 (H) 40 - 73 %    LYMPHOCYTES 12 (L) 21 - 52 %    MONOCYTES 8 3 - 10 %    EOSINOPHILS 0 0 - 5 %    BASOPHILS 0 0 - 2 %    ABS. NEUTROPHILS 13.1 (H) 1.8 - 8.0 K/UL    ABS. LYMPHOCYTES 1.9 0.9 - 3.6 K/UL    ABS. MONOCYTES 1.3 (H) 0.05 - 1.2 K/UL    ABS. EOSINOPHILS 0.0 0.0 - 0.4 K/UL    ABS. BASOPHILS 0.0 0.0 - 0.1 K/UL    DF AUTOMATED     PROTHROMBIN TIME + INR    Collection Time: 12/28/19  2:00 PM   Result Value Ref Range    Prothrombin time 13.4 11.5 - 15.2 sec    INR 1.0 0.8 - 1.2     CBC W/O DIFF    Collection Time: 12/28/19  7:30 PM   Result Value Ref Range    WBC 21.9 (H) 4.6 - 13.2 K/uL    RBC 2.22 (L) 4.20 - 5.30 M/uL    HGB 6.0 (L) 12.0 - 16.0 g/dL    HCT 18.4 (L) 35.0 - 45.0 %    MCV 82.9 74.0 - 97.0 FL    MCH 27.0 24.0 - 34.0 PG    MCHC 32.6 31.0 - 37.0 g/dL    RDW 20.0 (H) 11.6 - 14.5 %    PLATELET 578 995 - 229 K/uL    MPV 9.9 9.2 - 82.0 FL   METABOLIC PANEL, COMPREHENSIVE    Collection Time: 12/28/19  7:30 PM   Result Value Ref Range    Sodium 136 136 - 145 mmol/L    Potassium 3.9 3.5 - 5.5 mmol/L    Chloride 105 100 - 111 mmol/L    CO2 23 21 - 32 mmol/L    Anion gap 8 3.0 - 18 mmol/L    Glucose 92 74 - 99 mg/dL    BUN 6 (L) 7.0 - 18 MG/DL    Creatinine 0.60 0.6 - 1.3 MG/DL    BUN/Creatinine ratio 10 (L) 12 - 20      GFR est AA >60 >60 ml/min/1.73m2    GFR est non-AA >60 >60 ml/min/1.73m2    Calcium 7.3 (L) 8.5 - 10.1 MG/DL    Bilirubin, total 0.3 0.2 - 1.0 MG/DL    ALT (SGPT) 26 13 - 56 U/L    AST (SGOT) 34 10 - 38 U/L    Alk.  phosphatase 75 45 - 117 U/L    Protein, total 4.1 (L) 6.4 - 8.2 g/dL    Albumin 1.5 (L) 3.4 - 5.0 g/dL    Globulin 2.6 2.0 - 4.0 g/dL    A-G Ratio 0.6 (L) 0.8 - 1.7     FIBRINOGEN    Collection Time: 12/28/19  7:30 PM   Result Value Ref Range    Fibrinogen 454 (H) 210 - 451 mg/dL   PTT    Collection Time: 12/28/19  7:30 PM   Result Value Ref Range    aPTT 32.0 23.0 - 36.4 SEC   PROTHROMBIN TIME + INR    Collection Time: 12/28/19  7:30 PM Result Value Ref Range    Prothrombin time 13.7 11.5 - 15.2 sec    INR 1.1 0.8 - 1.2     CBC WITH AUTOMATED DIFF    Collection Time: 12/28/19 11:28 PM   Result Value Ref Range    WBC 22.9 (H) 4.6 - 13.2 K/uL    RBC 2.45 (L) 4.20 - 5.30 M/uL    HGB 6.7 (L) 12.0 - 16.0 g/dL    HCT 20.5 (L) 35.0 - 45.0 %    MCV 83.7 74.0 - 97.0 FL    MCH 27.3 24.0 - 34.0 PG    MCHC 32.7 31.0 - 37.0 g/dL    RDW 18.3 (H) 11.6 - 14.5 %    PLATELET 243 555 - 610 K/uL    MPV 10.0 9.2 - 11.8 FL    NEUTROPHILS 85 (H) 42 - 75 %    BAND NEUTROPHILS 8 (H) 0 - 5 %    LYMPHOCYTES 6 (L) 20 - 51 %    MONOCYTES 1 (L) 2 - 9 %    EOSINOPHILS 0 0 - 5 %    BASOPHILS 0 0 - 3 %    ABS. NEUTROPHILS 19.5 (H) 1.8 - 8.0 K/UL    ABS. LYMPHOCYTES 1.4 0.8 - 3.5 K/UL    ABS. MONOCYTES 0.2 0 - 1.0 K/UL    ABS. EOSINOPHILS 0.0 0.0 - 0.4 K/UL    ABS. BASOPHILS 0.0 0.0 - 0.1 K/UL    RBC COMMENTS ANISOCYTOSIS  2+        DF MANUAL     CBC WITH AUTOMATED DIFF    Collection Time: 12/29/19  4:50 AM   Result Value Ref Range    WBC 21.7 (H) 4.6 - 13.2 K/uL    RBC 2.41 (L) 4.20 - 5.30 M/uL    HGB 7.0 (L) 12.0 - 16.0 g/dL    HCT 20.8 (L) 35.0 - 45.0 %    MCV 86.3 74.0 - 97.0 FL    MCH 29.0 24.0 - 34.0 PG    MCHC 33.7 31.0 - 37.0 g/dL    RDW 16.4 (H) 11.6 - 14.5 %    PLATELET 351 705 - 688 K/uL    MPV 10.9 9.2 - 11.8 FL    NEUTROPHILS 85 (H) 42 - 75 %    BAND NEUTROPHILS 7 (H) 0 - 5 %    LYMPHOCYTES 5 (L) 20 - 51 %    MONOCYTES 3 2 - 9 %    EOSINOPHILS 0 0 - 5 %    BASOPHILS 0 0 - 3 %    ABS. NEUTROPHILS 18.4 (H) 1.8 - 8.0 K/UL    ABS. LYMPHOCYTES 1.1 0.8 - 3.5 K/UL    ABS. MONOCYTES 0.7 0 - 1.0 K/UL    ABS. EOSINOPHILS 0.0 0.0 - 0.4 K/UL    ABS.  BASOPHILS 0.0 0.0 - 0.1 K/UL    RBC COMMENTS ANISOCYTOSIS  2+        RBC COMMENTS SPHEROCYTES  1+        RBC COMMENTS HYPOCHROMIA  SLIGHT  POLYCHROMASIA  SLIGHT        DF MANUAL     METABOLIC PANEL, BASIC    Collection Time: 12/29/19  4:50 AM   Result Value Ref Range    Sodium 136 136 - 145 mmol/L    Potassium 4.0 3.5 - 5.5 mmol/L Chloride 106 100 - 111 mmol/L    CO2 25 21 - 32 mmol/L    Anion gap 5 3.0 - 18 mmol/L    Glucose 100 (H) 74 - 99 mg/dL    BUN 6 (L) 7.0 - 18 MG/DL    Creatinine 0.63 0.6 - 1.3 MG/DL    BUN/Creatinine ratio 10 (L) 12 - 20      GFR est AA >60 >60 ml/min/1.73m2    GFR est non-AA >60 >60 ml/min/1.73m2    Calcium 6.6 (L) 8.5 - 10.1 MG/DL   CBC WITH AUTOMATED DIFF    Collection Time: 12/29/19  7:35 AM   Result Value Ref Range    WBC 19.2 (H) 4.6 - 13.2 K/uL    RBC 2.40 (L) 4.20 - 5.30 M/uL    HGB 6.7 (L) 12.0 - 16.0 g/dL    HCT 19.9 (L) 35.0 - 45.0 %    MCV 82.9 74.0 - 97.0 FL    MCH 27.9 24.0 - 34.0 PG    MCHC 33.7 31.0 - 37.0 g/dL    RDW 16.5 (H) 11.6 - 14.5 %    PLATELET 493 430 - 364 K/uL    MPV 9.8 9.2 - 11.8 FL    NEUTROPHILS 89 (H) 40 - 73 %    LYMPHOCYTES 6 (L) 21 - 52 %    MONOCYTES 5 3 - 10 %    EOSINOPHILS 0 0 - 5 %    BASOPHILS 0 0 - 2 %    ABS. NEUTROPHILS 17.2 (H) 1.8 - 8.0 K/UL    ABS. LYMPHOCYTES 1.1 0.9 - 3.6 K/UL    ABS. MONOCYTES 0.9 0.05 - 1.2 K/UL    ABS. EOSINOPHILS 0.0 0.0 - 0.4 K/UL    ABS.  BASOPHILS 0.0 0.0 - 0.1 K/UL    DF AUTOMATED     METABOLIC PANEL, BASIC    Collection Time: 12/29/19  7:35 AM   Result Value Ref Range    Sodium 136 136 - 145 mmol/L    Potassium 4.0 3.5 - 5.5 mmol/L    Chloride 108 100 - 111 mmol/L    CO2 24 21 - 32 mmol/L    Anion gap 4 3.0 - 18 mmol/L    Glucose 92 74 - 99 mg/dL    BUN 6 (L) 7.0 - 18 MG/DL    Creatinine 0.65 0.6 - 1.3 MG/DL    BUN/Creatinine ratio 9 (L) 12 - 20      GFR est AA >60 >60 ml/min/1.73m2    GFR est non-AA >60 >60 ml/min/1.73m2    Calcium 6.8 (L) 8.5 - 10.1 MG/DL

## 2019-12-29 NOTE — ANESTHESIA PREPROCEDURE EVALUATION
Relevant Problems   No relevant active problems       Anesthetic History   No history of anesthetic complications            Review of Systems / Medical History  Patient summary reviewed, nursing notes reviewed and pertinent labs reviewed    Pulmonary  Within defined limits                 Neuro/Psych   Within defined limits           Cardiovascular    Hypertension              Exercise tolerance: >4 METS  Comments: No problems with exercise tolerance and acute blood loss. Tolerated csxn well   GI/Hepatic/Renal  Within defined limits           Pertinent negatives: No liver disease and renal disease  Comments: Nauseated now Endo/Other        Anemia  Pertinent negatives: No diabetes, hypothyroidism and hyperthyroidism   Other Findings            Physical Exam    Airway  Mallampati: II  TM Distance: 4 - 6 cm  Neck ROM: normal range of motion   Mouth opening: Normal     Cardiovascular    Rhythm: regular  Rate: normal         Dental  No notable dental hx       Pulmonary  Breath sounds clear to auscultation               Abdominal  GI exam deferred       Other Findings            Anesthetic Plan    ASA: 2, emergent  Anesthesia type: general          Induction: Intravenous and RSI  Anesthetic plan and risks discussed with: Patient      Tolerated csxn well .   Ga/oett/rsi

## 2019-12-29 NOTE — PROGRESS NOTES
7906 Bedside and Verbal shift change report given to Zoe Blanton RN (oncoming nurse) by Ashely Oliver RN (offgoing nurse). Report included the following information SBAR, Kardex, OR Summary, Procedure Summary, Intake/Output, MAR and Recent Results. Batsheva Tinoco 89 CNM paged to report recent lab results, including H/H, platelets, and WBC. Also discussed low urine output, vitals signs. No new orders received at this point in time, but she plans to discuss patient with Dr. Chris Casiano. [address] [place] [address] Dr called unit, and orders given for transfusion of 1 unit of packed red blood cells. Blood bank notified. Blood bank states they will call when unit of blood is ready. Also, orders received for post transfusion H/H to be drawn 4 hours after blood transfusion is complete. Plan to remove parson once patient is ambulatory. 1049 Transfusion of 1 unit packed red blood cells begins at 75 ml/hr. See blood administration flowsheet. 1106 Transfusion rate increased to 150 ml/hr. 1155 Transfusion rate increased to 200 ml/hr. 1255 Transfusion complete. 1310 Patient dangled at bedside. Up with help with Evermind device, and this RN. Tolerated well. Ambulated back to bed with assistance. Discussed activity with Jan Chang CNM. Plan to discontinue parson catheter after she is up to bathroom once more and tolerates activity. 1700 Patient up to bathroom. Tolerated activity well. Parson catheter discontinued. Abdominal binder placed and ice pack placed to incision. 1719 Bedside and Verbal shift change report given to Zaria Lamar RN (oncoming nurse) by Zoe Blanton RN (offgoing nurse). Report included the following information SBAR, Kardex, Procedure Summary, Intake/Output, MAR and Recent Results.

## 2019-12-29 NOTE — PROGRESS NOTES
Hospitalist Progress Note    Patient: Easton Espinosa MRN: [id number]  CSN: [id number]    YOB: 1980  Age: 46 y.o. Sex: female    DOA: 2019 LOS:  LOS: 2 days                Assessment and Plan:    Principal Problem:    Postpartum hemorrhage (2019)    Active Problems:    Acute on chronic anemia (2019)       premature rupture of membranes (2019)      Hypotension (2019)      H/O blood loss (2019)        Post partum Hemorrhage:  Seems to have stopped after D and C last night. Got another unit of blood today      Hypertension: borderline. Will start Labetolol if blood pressure goes up. Leukocytosis: better today. On empiric antibiotics        Chief complaint:  Hypotension post op. Ow with hypertension      Subjective:    Feels better overall        Review of systems:    General: No fevers or chills. Cardiovascular: No chest pain or pressure. No palpitations. Pulmonary: No shortness of breath. Gastrointestinal: No nausea, vomiting. Objective:    Vital signs/Intake and Output:  Visit Vitals  /87   Pulse 98   Temp 98.1 °F (36.7 °C)   Resp 16   Ht 5' 4\" (1.626 m)   Wt 63.5 kg (140 lb)   SpO2 100%   Breastfeeding Unknown   BMI 24.03 kg/m²     Current Shift:  701 - 1900  In: 768.8 [I.V.:493.8]  Out: 259 [UPMZP:147]  Last three shifts:  1901 - 700  In: 2040.7 [I.V.:700]  Out: 5046 [Urine:1980]    Physical Exam:  General: NAD, AAOx3. Non-toxic. HEENT: NC/AT. PERRLA, EOMI.  MMM. Lungs: Nml inspection. CTA B/L. No wheezing, rales or rhonchi. Heart:  S1S2 RRR,  PMI mid 5th IC space. No M/RG. Abdomen: Soft, NT/ND.  BS+. No peritoneal signs. Extremities: No C/C/E. Psych:   Nml affect. Neurologic:  2-12 intact. Strength 5/5 throughout.   Sensation symmetrical.          Labs: Results:       Chemistry Recent Labs     19  0735 19  0450 19  1930 19  2307 19  0412   GLU 92 100* 92 120* 82  136 136 138 140   K 4.0 4.0 3.9 4.0 3.8    106 105 107 109   CO2 24 25 23 24 23   BUN 6* 6* 6* 6* 9   CREA 0.65 0.63 0.60 0.82 0.67   CA 6.8* 6.6* 7.3* 7.4* 8.4*   AGAP 4 5 8 7 8   BUCR 9* 10* 10* 7* 13   AP  --   --  75 94 153*   TP  --   --  4.1* 4.6* 6.9   ALB  --   --  1.5* 1.6* 2.3*   GLOB  --   --  2.6 3.0 4.6*   AGRAT  --   --  0.6* 0.5* 0.5*      CBC w/Diff Recent Labs     12/29/19  0735 12/29/19  0450 12/28/19  2328   WBC 19.2* 21.7* 22.9*   RBC 2.40* 2.41* 2.45*   HGB 6.7* 7.0* 6.7*   HCT 19.9* 20.8* 20.5*    142 161   GRANS 89* 85* 85*   LYMPH 6* 5* 6*   EOS 0 0 0      Cardiac Enzymes No results for input(s): CPK, CKND1, KAREN in the last 72 hours. No lab exists for component: CKRMB, TROIP   Coagulation Recent Labs     12/28/19 1930 12/28/19  1400   PTP 13.7 13.4   INR 1.1 1.0   APTT 32.0  --        Lipid Panel No results found for: CHOL, CHOLPOCT, CHOLX, CHLST, CHOLV, 529160, HDL, HDLP, LDL, LDLC, DLDLP, 320328, VLDLC, VLDL, TGLX, TRIGL, TRIGP, TGLPOCT, CHHD, CHHDX   BNP No results for input(s): BNPP in the last 72 hours.    Liver Enzymes Recent Labs     12/28/19 1930   TP 4.1*   ALB 1.5*   AP 75   SGOT 34      Thyroid Studies No results found for: T4, T3U, TSH, TSHEXT     Procedures/imaging: see electronic medical records for all procedures/Xrays and details which were not copied into this note but were reviewed prior to creation of Plan

## 2019-12-30 ENCOUNTER — HOSPITAL ENCOUNTER (OUTPATIENT)
Dept: INFUSION THERAPY | Age: 39
End: 2019-12-30

## 2019-12-30 DIAGNOSIS — D64.9 ANEMIA, UNSPECIFIED TYPE: ICD-10-CM

## 2019-12-30 LAB
ABO + RH BLD: NORMAL
ALBUMIN SERPL-MCNC: 1.4 G/DL (ref 3.4–5)
ALBUMIN/GLOB SERPL: 0.5 {RATIO} (ref 0.8–1.7)
ALP SERPL-CCNC: 71 U/L (ref 45–117)
ALT SERPL-CCNC: 29 U/L (ref 13–56)
ANION GAP SERPL CALC-SCNC: 6 MMOL/L (ref 3–18)
AST SERPL-CCNC: 47 U/L (ref 10–38)
BASOPHILS # BLD: 0 K/UL (ref 0–0.1)
BASOPHILS NFR BLD: 0 % (ref 0–2)
BILIRUB SERPL-MCNC: 0.4 MG/DL (ref 0.2–1)
BLD PROD TYP BPU: NORMAL
BLOOD GROUP ANTIBODIES SERPL: NORMAL
BPU ID: NORMAL
BUN SERPL-MCNC: 9 MG/DL (ref 7–18)
BUN/CREAT SERPL: 13 (ref 12–20)
CALCIUM SERPL-MCNC: 6.9 MG/DL (ref 8.5–10.1)
CALLED TO:,BCALL1: NORMAL
CALLED TO:,BCALL2: NORMAL
CALLED TO:,BCALL3: NORMAL
CHLORIDE SERPL-SCNC: 109 MMOL/L (ref 100–111)
CO2 SERPL-SCNC: 25 MMOL/L (ref 21–32)
CREAT SERPL-MCNC: 0.67 MG/DL (ref 0.6–1.3)
CREAT UR-MCNC: 52 MG/DL (ref 30–125)
CROSSMATCH RESULT,%XM: NORMAL
DIFFERENTIAL METHOD BLD: ABNORMAL
EOSINOPHIL # BLD: 0.2 K/UL (ref 0–0.4)
EOSINOPHIL NFR BLD: 1 % (ref 0–5)
ERYTHROCYTE [DISTWIDTH] IN BLOOD BY AUTOMATED COUNT: 16.7 % (ref 11.6–14.5)
ERYTHROCYTE [DISTWIDTH] IN BLOOD BY AUTOMATED COUNT: 16.9 % (ref 11.6–14.5)
GLOBULIN SER CALC-MCNC: 2.8 G/DL (ref 2–4)
GLUCOSE SERPL-MCNC: 71 MG/DL (ref 74–99)
HCT VFR BLD AUTO: 22 % (ref 35–45)
HCT VFR BLD AUTO: 23.3 % (ref 35–45)
HGB BLD-MCNC: 7.5 G/DL (ref 12–16)
HGB BLD-MCNC: 7.8 G/DL (ref 12–16)
LDH SERPL L TO P-CCNC: 202 U/L (ref 81–234)
LYMPHOCYTES # BLD: 2.3 K/UL (ref 0.9–3.6)
LYMPHOCYTES NFR BLD: 15 % (ref 21–52)
MCH RBC QN AUTO: 28.2 PG (ref 24–34)
MCH RBC QN AUTO: 28.3 PG (ref 24–34)
MCHC RBC AUTO-ENTMCNC: 33.5 G/DL (ref 31–37)
MCHC RBC AUTO-ENTMCNC: 34.1 G/DL (ref 31–37)
MCV RBC AUTO: 83 FL (ref 74–97)
MCV RBC AUTO: 84.1 FL (ref 74–97)
MONOCYTES # BLD: 1.1 K/UL (ref 0.05–1.2)
MONOCYTES NFR BLD: 7 % (ref 3–10)
NEUTS SEG # BLD: 11.9 K/UL (ref 1.8–8)
NEUTS SEG NFR BLD: 77 % (ref 40–73)
PLATELET # BLD AUTO: 177 K/UL (ref 135–420)
PLATELET # BLD AUTO: 213 K/UL (ref 135–420)
PMV BLD AUTO: 9.2 FL (ref 9.2–11.8)
PMV BLD AUTO: 9.4 FL (ref 9.2–11.8)
POTASSIUM SERPL-SCNC: 3.7 MMOL/L (ref 3.5–5.5)
PROT SERPL-MCNC: 4.2 G/DL (ref 6.4–8.2)
PROT UR-MCNC: 158 MG/DL
PROT/CREAT UR-RTO: 3
RBC # BLD AUTO: 2.65 M/UL (ref 4.2–5.3)
RBC # BLD AUTO: 2.77 M/UL (ref 4.2–5.3)
SODIUM SERPL-SCNC: 140 MMOL/L (ref 136–145)
SPECIMEN EXP DATE BLD: NORMAL
STATUS OF UNIT,%ST: NORMAL
UNIT DIVISION, %UDIV: 0
URATE SERPL-MCNC: 5.5 MG/DL (ref 2.6–7.2)
WBC # BLD AUTO: 15.5 K/UL (ref 4.6–13.2)
WBC # BLD AUTO: 16.5 K/UL (ref 4.6–13.2)

## 2019-12-30 PROCEDURE — 74011250637 HC RX REV CODE- 250/637: Performed by: MIDWIFE

## 2019-12-30 PROCEDURE — 74011250637 HC RX REV CODE- 250/637: Performed by: OBSTETRICS & GYNECOLOGY

## 2019-12-30 PROCEDURE — 84156 ASSAY OF PROTEIN URINE: CPT

## 2019-12-30 PROCEDURE — 84550 ASSAY OF BLOOD/URIC ACID: CPT

## 2019-12-30 PROCEDURE — 85025 COMPLETE CBC W/AUTO DIFF WBC: CPT

## 2019-12-30 PROCEDURE — 80053 COMPREHEN METABOLIC PANEL: CPT

## 2019-12-30 PROCEDURE — 83615 LACTATE (LD) (LDH) ENZYME: CPT

## 2019-12-30 PROCEDURE — 36415 COLL VENOUS BLD VENIPUNCTURE: CPT

## 2019-12-30 PROCEDURE — 65270000029 HC RM PRIVATE

## 2019-12-30 PROCEDURE — 85027 COMPLETE CBC AUTOMATED: CPT

## 2019-12-30 RX ORDER — METHYLDOPA 250 MG/1
250 TABLET, FILM COATED ORAL EVERY 12 HOURS
Status: DISCONTINUED | OUTPATIENT
Start: 2019-12-30 | End: 2019-12-31 | Stop reason: HOSPADM

## 2019-12-30 RX ORDER — AMLODIPINE BESYLATE 5 MG/1
10 TABLET ORAL DAILY
Status: DISCONTINUED | OUTPATIENT
Start: 2019-12-30 | End: 2019-12-31 | Stop reason: HOSPADM

## 2019-12-30 RX ORDER — METHYLDOPA 250 MG/1
250 TABLET, FILM COATED ORAL ONCE
Status: COMPLETED | OUTPATIENT
Start: 2019-12-30 | End: 2019-12-30

## 2019-12-30 RX ADMIN — METHYLDOPA 250 MG: 250 TABLET, FILM COATED ORAL at 03:12

## 2019-12-30 RX ADMIN — IBUPROFEN 800 MG: 400 TABLET, FILM COATED ORAL at 11:06

## 2019-12-30 RX ADMIN — OXYCODONE HYDROCHLORIDE AND ACETAMINOPHEN 2 TABLET: 5; 325 TABLET ORAL at 02:28

## 2019-12-30 RX ADMIN — AMLODIPINE BESYLATE 10 MG: 5 TABLET ORAL at 03:12

## 2019-12-30 RX ADMIN — Medication 10 ML: at 08:02

## 2019-12-30 RX ADMIN — OXYCODONE HYDROCHLORIDE AND ACETAMINOPHEN 1 TABLET: 5; 325 TABLET ORAL at 06:38

## 2019-12-30 RX ADMIN — OXYCODONE HYDROCHLORIDE AND ACETAMINOPHEN 1 TABLET: 5; 325 TABLET ORAL at 08:00

## 2019-12-30 RX ADMIN — OXYCODONE HYDROCHLORIDE AND ACETAMINOPHEN 2 TABLET: 5; 325 TABLET ORAL at 14:35

## 2019-12-30 RX ADMIN — SIMETHICONE CHEW TAB 80 MG 80 MG: 80 TABLET ORAL at 08:00

## 2019-12-30 RX ADMIN — DOCUSATE SODIUM 100 MG: 100 CAPSULE, LIQUID FILLED ORAL at 08:00

## 2019-12-30 RX ADMIN — METHYLDOPA 250 MG: 250 TABLET, FILM COATED ORAL at 20:56

## 2019-12-30 RX ADMIN — OXYCODONE HYDROCHLORIDE AND ACETAMINOPHEN 2 TABLET: 5; 325 TABLET ORAL at 20:56

## 2019-12-30 NOTE — PROGRESS NOTES
Hospitalist Progress Note    Patient: Klaudia Corcoran MRN: [id number]  CSN: [id number]    YOB: 1980  Age: 46 y.o. Sex: female    DOA: 2019 LOS:  LOS: 3 days          Chief Complaint:    bleeding      Assessment/Plan     Severe ac blood loss anemia/post partum hemorrhage associated with hypotension-improved  Received 2 units blood    S/p LTCS post op day 3  Post op day 2 s/p Dand C for bleeding post partum, and POC removal    HTN-resumption of aldomet and norvasc today, monitor    She appears improved and without complaints    Will sign off, call med team as needed  Disposition :  Patient Active Problem List   Diagnosis Code    Acute on chronic anemia D64.9    Supervision of normal intrauterine pregnancy in multigravida in second trimester Z34.82    Pregnancy-induced hypertension in second trimester O13.2     premature rupture of membranes O42.919    Hypotension I95.9    H/O blood loss Z87.898    Postpartum hemorrhage O72.1       Subjective:    Denies CP, SOB, HA, dizziness    Review of systems:      Respiratory: denies SOB  Cardiovascular: denies chest pain, palpitations  Gastrointestinal: denies nausea, vomiting, diarrhea      Vital signs/Intake and Output:  Visit Vitals  BP (!) 159/91   Pulse (!) 106   Temp 98 °F (36.7 °C)   Resp 18   Ht 5' 4\" (1.626 m)   Wt 63.5 kg (140 lb)   SpO2 98%   Breastfeeding Unknown   BMI 24.03 kg/m²     Current Shift:  No intake/output data recorded. Last three shifts:   1901 -  0700  In: 3060 [P.O.:700;  I.V.:1775]  Out: 3506 [Urine:3180]    Exam:    General: Well developed, alert, NAD, OX3  CVS:Regular rate and rhythm, no M/R/G, S1/S2 heard, no thrill  Lungs:Clear to auscultation bilaterally, no wheezes, rhonchi, or rales  Abdomen: Soft, Nontender, No distention, Normal Bowel sounds, No hepatomegaly  Extremities: No C/C/E, pulses palpable 2+  Neuro:grossly normal , follows commands  Psych:appropriate                Labs: Results:       Chemistry Recent Labs     12/30/19  0412 12/29/19  0735 12/29/19  0450 12/28/19  1930 12/27/19  2307   GLU 71* 92 100* 92 120*    136 136 136 138   K 3.7 4.0 4.0 3.9 4.0    108 106 105 107   CO2 25 24 25 23 24   BUN 9 6* 6* 6* 6*   CREA 0.67 0.65 0.63 0.60 0.82   CA 6.9* 6.8* 6.6* 7.3* 7.4*   AGAP 6 4 5 8 7   BUCR 13 9* 10* 10* 7*   AP 71  --   --  75 94   TP 4.2*  --   --  4.1* 4.6*   ALB 1.4*  --   --  1.5* 1.6*   GLOB 2.8  --   --  2.6 3.0   AGRAT 0.5*  --   --  0.6* 0.5*      CBC w/Diff Recent Labs     12/30/19 0412 12/29/19  1717 12/29/19 0735 12/29/19  0450   WBC 15.5*  --  19.2* 21.7*   RBC 2.65*  --  2.40* 2.41*   HGB 7.5* 8.7* 6.7* 7.0*   HCT 22.0* 25.6* 19.9* 20.8*     --  137 142   GRANS 77*  --  89* 85*   LYMPH 15*  --  6* 5*   EOS 1  --  0 0      Cardiac Enzymes No results for input(s): CPK, CKND1, KAREN in the last 72 hours. No lab exists for component: CKRMB, TROIP   Coagulation Recent Labs     12/28/19 1930 12/28/19  1400   PTP 13.7 13.4   INR 1.1 1.0   APTT 32.0  --        Lipid Panel No results found for: CHOL, CHOLPOCT, CHOLX, CHLST, CHOLV, 872313, HDL, HDLP, LDL, LDLC, DLDLP, 488473, VLDLC, VLDL, TGLX, TRIGL, TRIGP, TGLPOCT, CHHD, CHHDX   BNP No results for input(s): BNPP in the last 72 hours.    Liver Enzymes Recent Labs     12/30/19  0412   TP 4.2*   ALB 1.4*   AP 71   SGOT 47*      Thyroid Studies No results found for: T4, T3U, TSH, TSHEXT     Procedures/imaging: see electronic medical records for all procedures/Xrays and details which were not copied into this note but were reviewed prior to creation of Dhruv Wakefield MD

## 2019-12-30 NOTE — OP NOTES
Section Operative Note     Surgeon(s): Bi Peraza M.D. Pre-operative Diagnosis: Intrauterine pregnancy at 35 weeks, SROM, vaginal Bleeding, Hx prior c/s  X 4  Post-operative Diagnosis: same as preop diagnosis. Procedure(s) Performed: Repeat MidTransverse  Section                                               Anesthesia: Spinal  Findings: viable  infant, Apgars 8,9, normal uterus, ovaries, tubes  Complications: none  Estimated Blood Loss: 500 mL  Specimens: placenta  Procedure:   Patient was taken to the OR after informed consent had been obtained. Spinal epidural was then placed. She was then placed in a dorsal supine position with a left lateral tilt. She was then prepped and draped in a sterile fashion. Time out was completed. Attention was turned to the abdomen and an Allis test confirmed adequate anesthesia. A Pfannenstiel skin incision was made 3 cm above the symphysis pubis. The incision was carried down to the fascia. Dense adhesions were noted. The fascia was opened in the midline with sharp dissection. The rectus muscle was divided bluntly. Dense adhesions were again noted. The adhesions were taken down sharply using metzenbaum scissors until the uterus could be palpated. The peritoneum was entered with good visualization of underlying structures. The bladder blade was inserted. Dense adhesions of the bladder to the lower uterine segment were noted. The uterus was scored in the mid uterine segment and then entered in the midline. The uterine incision was extended bilaterally, transversly. The fetal head was flexed, pressure was applied to the abdomen and the fetus was delivered through the uterine incision. Naso and oropharynx were bulb suctioned. The cord was clamped and cut. Cord segment was obtained for cord blood gases. The infant was handed to the waiting pediatric nurse. The placenta was manually extracted. The uterus was cleared of all clot and debris.  The incision was closed with 0 Vicryl in a running fashion. A second layer of 0 vicryl was placed in an imbricating fashion. Excellent hemostasis was noted. The uterus was returned to the abdomen. The rectus muscles were reapproximated using two interrupted sutures of 0 vicryl in a figure of eight fashion. The fascia was closed in a running fashion with 0 vicryl. The skin was closed with Insorb staples and covered with a sterile dressing. The counts were correct. The mother and child tolerated the procedure well.      Suri Vasques M.D.

## 2019-12-30 NOTE — PROGRESS NOTES
0720 Bedside and Verbal shift change report given to Jacobo Ayers RN (oncoming nurse) by Judy Guerra RN and Jocelyn Kidd RN (offgoing nurse). Report included the following information SBAR, Kardex, OR Summary, Procedure Summary, Intake/Output, MAR and Recent Results. Shahid Vanessa [facility] notified of urine protein/creatinine ratio result. Miryam Cardoza believes that since urine collected was not a catheterized sample (was clean catch) it is possibly contaminated from vaginal lochia. Giolore plans to consult with Dr. Daxa Bueno. Plan to watch blood pressures closely for now (q 1 hour). Plan to keep patient in L/D (no transfer to postpartum yet). Cherry Hilario in to see patient to evaluate anemia and blood pressure trends. [address] states patient is stable, and to move her to postpartum room. 1120 TRANSFER - OUT REPORT:    Verbal report given to Antonia Chung RN (name) on Maria Esther Omer  being transferred to mother baby (unit) for routine progression of care       Report consisted of patients Situation, Background, Assessment and   Recommendations(SBAR). Information from the following report(s) SBAR, Kardex, OR Summary, Procedure Summary, Intake/Output, MAR and Recent Results was reviewed with the receiving nurse. Lines:   Peripheral IV 12/28/19 Right;Posterior Hand (Active)   Site Assessment Clean, dry, & intact 12/30/2019 10:59 AM   Phlebitis Assessment 0 12/30/2019 10:59 AM   Infiltration Assessment 0 12/29/2019  4:05 PM   Dressing Status Clean, dry, & intact; Occlusive 12/30/2019 10:59 AM   Dressing Type Transparent;Tape 12/30/2019 10:59 AM   Hub Color/Line Status Green 12/30/2019 10:59 AM   Action Taken Open ports on tubing capped 12/30/2019 10:59 AM   Alcohol Cap Used Yes 12/30/2019 10:59 AM        Opportunity for questions and clarification was provided.       Patient transported with:   Registered Nurse

## 2019-12-30 NOTE — LACTATION NOTE
Per RN, mom stated she no longer intends to use the breast pump. Will remain available if patient needs LC assistance.

## 2019-12-30 NOTE — LACTATION NOTE
Per mom, no longer wants to breastfeed due to \"going through too much with this delivery. \" Educated on how to dry up milk supply and assisted patient in using compression bandage to wrap breasts and ice packs given for comfort. No questions at this time.

## 2019-12-30 NOTE — PROGRESS NOTES
1905 Bedside and Verbal shift change report given to CLAUDIO Jenkins and CLAUDIO Ricks RN (oncoming nurse) by Deirdre Polk. Daina Hernandez RN (offgoing nurse). Report included the following information SBAR, Kardex, Intake/Output, MAR, Recent Results and Med Rec Status. Pt sitting up in bed eating dinner. Family and infant at bedside. 2156 Assisted pt up to BR. Gait slow and steady, pt denied dizziness or lightheadedness. Voided without difficulty and returned to bed.    2257 Spoke to CLAUDIO Lopez CNM via phone. Reviewed today's blood pressures. No new orders received at this time. Will continue to monitor through the night. 12 Paged CLAUDIO Lopez CNM.    7491 SBAR report to CLAUDIO Lopez CNM regarding pt blood pressure. IGORM will review chart and call with orders. 0253 CLAUDIO Lopez CNM called with orders. 0715 Bedside and Verbal shift change report given to Crissy Garcia RN (oncoming nurse) by Deirdre Polk. Steve MEADE and CLAUDIO Ricks RN (offgoing nurse). Report included the following information SBAR, Kardex, Intake/Output, MAR, Recent Results and Med Rec Status.

## 2019-12-30 NOTE — PROGRESS NOTES
Problem: Patient Education: Go to Patient Education Activity  Goal: Patient/Family Education  Outcome: Progressing Towards Goal     Problem:  Delivery: Postpartum Day 2  Goal: Off Pathway (Use only if patient is Off Pathway)  Outcome: Progressing Towards Goal  Goal: Activity/Safety  Outcome: Progressing Towards Goal  Goal: Nutrition/Diet  Outcome: Progressing Towards Goal  Goal: Discharge Planning  Outcome: Progressing Towards Goal  Goal: Medications  Outcome: Progressing Towards Goal  Goal: Treatments/Interventions/Procedures  Outcome: Progressing Towards Goal  Goal: Psychosocial  Outcome: Progressing Towards Goal  Goal: *Vital signs within defined limits  Outcome: Progressing Towards Goal  Goal: *Labs within defined limits  Outcome: Progressing Towards Goal  Goal: *Hemodynamically stable  Outcome: Progressing Towards Goal  Goal: *Optimal pain control at patient's stated goal  Outcome: Progressing Towards Goal  Goal: *Participates in infant care  Outcome: Progressing Towards Goal  Goal: *Demonstrates progressive activity  Outcome: Progressing Towards Goal  Goal: *Appropriate parent-infant bonding  Outcome: Progressing Towards Goal  Goal: *Tolerating diet  Outcome: Progressing Towards Goal     Problem:  Delivery: Postpartum Day 3  Goal: Off Pathway (Use only if patient is Off Pathway)  Outcome: Progressing Towards Goal  Goal: Activity/Safety  Outcome: Progressing Towards Goal  Goal: Consults, if ordered  Outcome: Progressing Towards Goal  Goal: Nutrition/Diet  Outcome: Progressing Towards Goal  Goal: Discharge Planning  Outcome: Progressing Towards Goal  Goal: Medications  Outcome: Progressing Towards Goal  Goal: Treatments/Interventions/Procedures  Outcome: Progressing Towards Goal  Goal: Psychosocial  Outcome: Progressing Towards Goal     Problem:  Delivery: Discharge Outcomes  Goal: *Follow-up appointments as indicated  Outcome: Progressing Towards Goal  Goal: *Describes available resources and support systems  Outcome: Progressing Towards Goal  Goal: *No signs and symptoms of infection  Outcome: Progressing Towards Goal  Goal: *Birth certificate information completed  Outcome: Progressing Towards Goal  Goal: *Received and verbalizes understanding of discharge plan and instructions  Outcome: Progressing Towards Goal  Goal: *Vital signs within defined limits  Outcome: Progressing Towards Goal  Goal: *Labs within defined limits  Outcome: Progressing Towards Goal  Goal: *Hemodynamically stable  Outcome: Progressing Towards Goal  Goal: *Optimal pain control at patient's stated goal  Outcome: Progressing Towards Goal  Goal: *Participates in infant care  Outcome: Progressing Towards Goal  Goal: *Demonstrates progressive activity  Outcome: Progressing Towards Goal  Goal: *Appropriate parent-infant bonding  Outcome: Progressing Towards Goal  Goal: *Tolerating diet  Outcome: Progressing Towards Goal  Goal: *Verbalizes name, dosage, time, side effects, and number of days to continue medications  Outcome: Progressing Towards Goal  Goal: *Influenza vaccine administered (October-March)  Outcome: Progressing Towards Goal     Problem: Falls - Risk of  Goal: *Absence of Falls  Description  Document Monae Fall Risk and appropriate interventions in the flowsheet.   Outcome: Progressing Towards Goal  Note: Fall Risk Interventions:            Medication Interventions: Patient to call before getting OOB    Elimination Interventions: Patient to call for help with toileting needs              Problem: Patient Education: Go to Patient Education Activity  Goal: Patient/Family Education  Outcome: Progressing Towards Goal

## 2019-12-30 NOTE — PROGRESS NOTES
1719 Bedside and Verbal shift change report given to David Conrad RN   (oncoming nurse) by Rowdy Briseno RN (offgoing nurse). Report included the following information SBAR, Kardex, Procedure Summary, Intake/Output, MAR, Recent Results and Med Rec Status. 1905 Bedside and Verbal shift change report given to Dane Heredia RN (oncoming nurse) by David Conrad RN   (offgoing nurse). Report included the following information SBAR, Kardex, Procedure Summary, Intake/Output, MAR, Recent Results and Med Rec Status.

## 2019-12-30 NOTE — PROGRESS NOTES
Patient's blood pressures elevated. Watching patient's BPs for now. Aldomet and Norvasc restarted at 3am per report. Will continue to monitor. For now, will not transfer to postpartum unit until BPs monitored for a bit longer.

## 2019-12-31 VITALS
OXYGEN SATURATION: 100 % | HEART RATE: 102 BPM | BODY MASS INDEX: 23.9 KG/M2 | WEIGHT: 140 LBS | HEIGHT: 64 IN | DIASTOLIC BLOOD PRESSURE: 78 MMHG | SYSTOLIC BLOOD PRESSURE: 130 MMHG | RESPIRATION RATE: 17 BRPM | TEMPERATURE: 98.5 F

## 2019-12-31 LAB
BASOPHILS # BLD: 0 K/UL (ref 0–0.1)
BASOPHILS NFR BLD: 0 % (ref 0–2)
DIFFERENTIAL METHOD BLD: ABNORMAL
EOSINOPHIL # BLD: 0.1 K/UL (ref 0–0.4)
EOSINOPHIL NFR BLD: 0 % (ref 0–5)
ERYTHROCYTE [DISTWIDTH] IN BLOOD BY AUTOMATED COUNT: 16.8 % (ref 11.6–14.5)
HCT VFR BLD AUTO: 24.8 % (ref 35–45)
HGB BLD-MCNC: 8.2 G/DL (ref 12–16)
LYMPHOCYTES # BLD: 1.1 K/UL (ref 0.9–3.6)
LYMPHOCYTES NFR BLD: 7 % (ref 21–52)
MCH RBC QN AUTO: 28.4 PG (ref 24–34)
MCHC RBC AUTO-ENTMCNC: 33.1 G/DL (ref 31–37)
MCV RBC AUTO: 85.8 FL (ref 74–97)
MONOCYTES # BLD: 0.7 K/UL (ref 0.05–1.2)
MONOCYTES NFR BLD: 4 % (ref 3–10)
NEUTS SEG # BLD: 14.4 K/UL (ref 1.8–8)
NEUTS SEG NFR BLD: 89 % (ref 40–73)
PLATELET # BLD AUTO: 276 K/UL (ref 135–420)
PMV BLD AUTO: 9.3 FL (ref 9.2–11.8)
RBC # BLD AUTO: 2.89 M/UL (ref 4.2–5.3)
WBC # BLD AUTO: 16.3 K/UL (ref 4.6–13.2)

## 2019-12-31 PROCEDURE — 74011250637 HC RX REV CODE- 250/637: Performed by: OBSTETRICS & GYNECOLOGY

## 2019-12-31 PROCEDURE — 74011250637 HC RX REV CODE- 250/637: Performed by: MIDWIFE

## 2019-12-31 PROCEDURE — 85025 COMPLETE CBC W/AUTO DIFF WBC: CPT

## 2019-12-31 PROCEDURE — 74011250637 HC RX REV CODE- 250/637: Performed by: ADVANCED PRACTICE MIDWIFE

## 2019-12-31 PROCEDURE — 36415 COLL VENOUS BLD VENIPUNCTURE: CPT

## 2019-12-31 RX ORDER — AMOXICILLIN 250 MG
1 CAPSULE ORAL DAILY
Qty: 60 TAB | Refills: 1 | Status: SHIPPED | OUTPATIENT
Start: 2019-12-31

## 2019-12-31 RX ORDER — OXYCODONE AND ACETAMINOPHEN 5; 325 MG/1; MG/1
1-2 TABLET ORAL
Qty: 25 TAB | Refills: 0 | Status: SHIPPED | OUTPATIENT
Start: 2019-12-31 | End: 2020-01-03

## 2019-12-31 RX ORDER — DOCUSATE SODIUM 100 MG/1
100 CAPSULE, LIQUID FILLED ORAL
Qty: 30 CAP | Refills: 0 | Status: SHIPPED | OUTPATIENT
Start: 2019-12-31 | End: 2020-03-30

## 2019-12-31 RX ORDER — IBUPROFEN 800 MG/1
800 TABLET ORAL
Qty: 90 TAB | Refills: 0 | Status: SHIPPED | OUTPATIENT
Start: 2019-12-31

## 2019-12-31 RX ORDER — LANOLIN ALCOHOL/MO/W.PET/CERES
325 CREAM (GRAM) TOPICAL
Qty: 90 TAB | Refills: 2 | Status: SHIPPED | OUTPATIENT
Start: 2019-12-31

## 2019-12-31 RX ORDER — LANOLIN ALCOHOL/MO/W.PET/CERES
1 CREAM (GRAM) TOPICAL
Status: DISCONTINUED | OUTPATIENT
Start: 2019-12-31 | End: 2019-12-31 | Stop reason: HOSPADM

## 2019-12-31 RX ADMIN — METHYLDOPA 250 MG: 250 TABLET, FILM COATED ORAL at 08:41

## 2019-12-31 RX ADMIN — BISACODYL 10 MG: 10 SUPPOSITORY RECTAL at 10:05

## 2019-12-31 RX ADMIN — ACETAMINOPHEN 650 MG: 325 TABLET ORAL at 03:04

## 2019-12-31 RX ADMIN — AMLODIPINE BESYLATE 10 MG: 5 TABLET ORAL at 08:41

## 2019-12-31 RX ADMIN — FERROUS SULFATE TAB 325 MG (65 MG ELEMENTAL FE) 325 MG: 325 (65 FE) TAB at 16:25

## 2019-12-31 RX ADMIN — DOCUSATE SODIUM 100 MG: 100 CAPSULE, LIQUID FILLED ORAL at 10:05

## 2019-12-31 RX ADMIN — IBUPROFEN 800 MG: 400 TABLET, FILM COATED ORAL at 05:40

## 2019-12-31 RX ADMIN — IBUPROFEN 800 MG: 400 TABLET, FILM COATED ORAL at 16:25

## 2019-12-31 RX ADMIN — OXYCODONE HYDROCHLORIDE AND ACETAMINOPHEN 2 TABLET: 5; 325 TABLET ORAL at 10:11

## 2019-12-31 NOTE — DISCHARGE INSTRUCTIONS
***Perscriptions sent to pharmacy on file ***      POST 7719  [facility]    Name: Obdulia Cormier  YOB: 1980  Primary Diagnosis: Principal Problem:    Postpartum hemorrhage (2019)    Active Problems:    Acute on chronic anemia (2019)       premature rupture of membranes (2019)      Hypotension (2019)      H/O blood loss (2019)        General:     Diet/Diet Restrictions:  Eight 8-ounce glasses of fluid daily (water, juices); avoid excessive caffeine intake. Meals/snacks as desired which are high in fiber and carbohydrates and low in fat and cholesterol. Physical Activity / Restrictions / Safety:     Avoid heavy lifting, no more than the baby alone (not the baby in the car seat). For 2-3 weeks; limit use of stairs to 2 times daily for the first week home. No driving for two weeks. Avoid intercourse until you complete your postpartum check. No douching or tampon use. Check with obstetrician before starting or resuming an exercise program.    Call your doctor for the following:     Fever over 101 degrees by mouth. Vaginal bleeding that soaks more than 2 pads in an hour for more than one hour or if the discharge starts to smell bad. Red streaks or increased swelling of legs, painful red streaks on your breast.  Foul discharge from your incision or the appearance or tender, red areas around it. If you feel extremely anxious or overwhelmed. If you have thoughts of harming yourself and/or your baby. Pain Management:     Pain Management:   Take Ibuprofen (Advil, Motrin), as directed for pain and use prescription narcotic pain medication as needed. Heating pad to  incision as needed. Follow-Up Care:     Appointment with MD: Call and make appt in 3 says for bp check and pp followup.    Need to also make an appt in 6 weeks   Follow-up Appointments   Procedures    FOLLOW UP VISIT Appointment in: 6 Weeks     Standing Status: Standing     Number of Occurrences:   1     Order Specific Question:   Appointment in     Answer:   6 Weeks     Telephone number: [phone]      Signed By: Shiv Gramajo CNM

## 2019-12-31 NOTE — PROGRESS NOTES
1930: Assumed care of pt from [address]. Pt resting in chair, call light within reach, questions answered. Sarabjit Hernandez called and asked for repeat CBC in AM. Orders put in.    2055: Rounded on pt, vitals taken, meds given. Pt has temp of 99.5, B/P of 157/93 and heart rate of 107. Marine So CNM updated on pt. Will repeat temp in 1 hour. 2148: Pt temp 99.1, Darlene REIS updated. CBC changed for stat. 2200: Rounded on pt, pt resting in bed. Heat requested to be lowered. 2211: Labs resulted, Marine So CNM called and updated on pt. Nursing staff is to watch pt over night for any signs or symptoms of infection/bleeding. 2300: Bedside shift change report given to [address] (oncoming nurse) by Eugene Lopez RN (offgoing nurse). Report included the following information SBAR, Kardex and MAR.

## 2019-12-31 NOTE — PROGRESS NOTES
0000- Bedside and Verbal shift change report given to CONNER Tomas (oncoming nurse) by Marielle Bejarano. Pako Ibarra RN (offgoing nurse). Report included the following information SBAR, Kardex, Intake/Output, MAR and Recent Results. Per S. Pako Ibarra, RN, CNM aware of pts most current lab results and elevated temp. Will continue to monitor. 0005- Plan of care reviewed with pt. Pt denies any drug known drug allergies. AAOx4. Vital signs stable. Pain 2/10, declines any interventions at this time, will continue to monitor. Educated on pain management and medications available, plan of care, signs and symptoms to report, and keeping gripper socks on while ambulating. Encouraged pt to increase ambulation time within the unit. Whiteboard updated. Breath sounds clear bilaterally. Fundus firm at U -2, scant rubra lochia. No clots noted. Pt denies any headache, visual disturbances, or epigastric pain. INT present, flushed, capped, and patent. No further questions on concerns at this time. Assessment complete. Call bell within reach. Bed in lowest position. 3628- Fundus firm at U -2, scant rubra lochia. No clots noted. Pt denies any headache, visual disturbances, or epigastric pain. Pain rated 0/10, will continue to monitor. 0259- VS obtained. Pt resting comfortably in bed. Chest rise, fall and unlabored respirations observed, awoken to voice. Bed in lowest position. Call bell within reach. 0356- Temp reassessed, resulting 99.2. Fundus firm at U -2, scant rubra lochia. No clots noted. Pt denies any headache, visual disturbances, or epigastric pain. 0537- VSS. Temp reassessed, resulting 98.9. Pain rated 6/10, medication administered per order. Fundus firm at U -2, scant rubra lochia. No clots noted. Pt denies any headache, visual disturbances, or epigastric pain. All needs addressed at this time. at this time. Bed in lowest position. Call bell within reach. 0630- Pain reassessed, rated 2/10.  Declines additional interventions. Will continue to monitor. 0700- Bedside and Verbal shift change report given to FRAN Townsend (oncoming nurse) by Charu Rose RN (offgoing nurse). Report included the following information SBAR, Kardex, Intake/Output, MAR and Recent Results. Opportunities for questions was provided.

## 2019-12-31 NOTE — DISCHARGE SUMMARY
Discharge Summary     Patient: David Metcalf MRN: [id number]  SSN: [id number]    YOB: 1980  Age: 46 y.o. Sex: female       Admit Date: 2019    Discharge Date: 2019      Admission Diagnoses:  premature rupture of membranes [O42.919]    Discharge Diagnoses:   Problem List as of 2019 Date Reviewed: 2019          Codes Class Noted - Resolved    Hypotension ICD-10-CM: I95.9  ICD-9-CM: 458.9  2019 - Present        H/O blood loss ICD-10-CM: Z87.898  ICD-9-CM: V12.59  2019 - Present        * (Principal) Postpartum hemorrhage ICD-10-CM: O72.1  ICD-9-CM: 666.10  2019 - Present         premature rupture of membranes ICD-10-CM: O42.919  ICD-9-CM: 658.10  2019 - Present        Acute on chronic anemia ICD-10-CM: D64.9  ICD-9-CM: 285.9  2019 - Present        Supervision of normal intrauterine pregnancy in multigravida in second trimester ICD-10-CM: Z34.82  ICD-9-CM: V22.1  2019 - Present        Pregnancy-induced hypertension in second trimester ICD-10-CM: O13.2  ICD-9-CM: 642.33  2019 - Present        RESOLVED: Supervision of other high-risk pregnancy(V23.89) ICD-10-CM: O09.899  ICD-9-CM: V23.89  2012 - 2012        RESOLVED: Previous  delivery, antepartum condition or complication ZJK-05-US: I40.469  ICD-9-CM: 654.23  2012 - 2012               Discharge Condition: Good    Hospital Course: complicated by postop/postpartum bleeding, D&C following c/s    Consults: None    Significant Diagnostic Studies: labs: H&H    Disposition: home    Discharge Medications:   Current Discharge Medication List      START taking these medications    Details   docusate sodium (COLACE) 100 mg capsule Take 1 Cap by mouth daily as needed for Constipation for up to 90 days. Indications: constipation  Qty: 30 Cap, Refills: 0      ibuprofen (MOTRIN) 800 mg tablet Take 1 Tab by mouth every eight (8) hours as needed for Pain.   Qty: 90 Tab, Refills: 0      oxyCODONE-acetaminophen (PERCOCET) 5-325 mg per tablet Take 1-2 Tabs by mouth every four (4) hours as needed for Pain for up to 3 days. Max Daily Amount: 12 Tabs. Qty: 25 Tab, Refills: 0    Associated Diagnoses:  delivery delivered         CONTINUE these medications which have NOT CHANGED    Details   PNV No12-Iron-FA-DSS-OM-3 29 mg iron-1 mg -50 mg CPKD Take  by mouth.      methyldopa (ALDOMET) 250 mg tablet Take 250 mg by mouth two (2) times a day. Indications: high blood pressure      ferrous sulfate 324 mg (65 mg iron) tablet Take 1 Tab by mouth Daily (before breakfast). Qty: 60 Tab, Refills: 0      amLODIPine (NORVASC) 10 mg tablet Take 10 mg by mouth daily.     Indications: HYPERTENSION             Activity: Activity as tolerated and no heavy lifting, pelvic rest for 6 weeks  Diet: Regular Diet  Wound Care: Keep wound clean and dry    Follow-up Appointments   Procedures    FOLLOW UP VISIT Appointment in: 6 Weeks     Standing Status:   Standing     Number of Occurrences:   1     Order Specific Question:   Appointment in     Answer:   6 Weeks       Signed By: Angella Draper CNM     2019

## 2019-12-31 NOTE — DISCHARGE SUMMARY
Obstetrical Discharge Summary     Name: Hubert Bender MRN: [id number]  SSN: [id number]    YOB: 1980  Age: 46 y.o. Sex: female      Admit Date: 2019    Discharge Date: 2019    Admitting Physician: Blank Sanz MD     Attending Physician:  Leda Avelar MD     Discharge Diagnoses:   Information for the patient's :  Raymon Gunter [[id number]]   Delivery of a 1.87 kg female infant via , Low Transverse on 2019 at 5:31 AM  by Blank Sanz. Apgars were 7  and 8 . Additional Diagnoses:   Problem List as of 2019 Date Reviewed: 2019          Codes Class Noted - Resolved    Hypotension ICD-10-CM: I95.9  ICD-9-CM: 458.9  2019 - Present        H/O blood loss ICD-10-CM: Z87.898  ICD-9-CM: V12.59  2019 - Present        * (Principal) Postpartum hemorrhage ICD-10-CM: O72.1  ICD-9-CM: 666.10  2019 - Present         premature rupture of membranes ICD-10-CM: O42.919  ICD-9-CM: 658.10  2019 - Present        Acute on chronic anemia ICD-10-CM: D64.9  ICD-9-CM: 285.9  2019 - Present        Supervision of normal intrauterine pregnancy in multigravida in second trimester ICD-10-CM: Z34.82  ICD-9-CM: V22.1  2019 - Present        Pregnancy-induced hypertension in second trimester ICD-10-CM: O13.2  ICD-9-CM: 642.33  2019 - Present        RESOLVED: Supervision of other high-risk pregnancy(V23.89) ICD-10-CM: O09.899  ICD-9-CM: V23.89  2012 - 2012        RESOLVED: Previous  delivery, antepartum condition or complication JRD-37-DD: C29.556  ICD-9-CM: 654.23  2012 - 2012              Lab Results   Component Value Date/Time    Rubella, External imm 2019    GrBStrep, External NEG 2012 09:14 AM     Recent Labs     19  2200   HGB 7.8*       Hospital Course: Postpartum hospital course complicated by anemia and CHTN that added 1 day to her length of stay .     Patient Instructions:   Current Discharge Medication List      START taking these medications    Details   docusate sodium (COLACE) 100 mg capsule Take 1 Cap by mouth daily as needed for Constipation for up to 90 days. Indications: constipation  Qty: 30 Cap, Refills: 0      ibuprofen (MOTRIN) 800 mg tablet Take 1 Tab by mouth every eight (8) hours as needed for Pain. Qty: 90 Tab, Refills: 0      oxyCODONE-acetaminophen (PERCOCET) 5-325 mg per tablet Take 1-2 Tabs by mouth every four (4) hours as needed for Pain for up to 3 days. Max Daily Amount: 12 Tabs. Qty: 25 Tab, Refills: 0    Associated Diagnoses:  delivery delivered      ferrous sulfate 325 mg (65 mg iron) tablet Take 1 Tab by mouth three (3) times daily (with meals). Qty: 90 Tab, Refills: 2      senna-docusate (COLACE 2-IN-1) 8.6-50 mg per tablet Take 1 Tab by mouth daily. Qty: 60 Tab, Refills: 1         CONTINUE these medications which have NOT CHANGED    Details   PNV No12-Iron-FA-DSS-OM-3 29 mg iron-1 mg -50 mg CPKD Take  by mouth.      methyldopa (ALDOMET) 250 mg tablet Take 250 mg by mouth two (2) times a day. Indications: high blood pressure      ferrous sulfate 324 mg (65 mg iron) tablet Take 1 Tab by mouth Daily (before breakfast). Qty: 60 Tab, Refills: 0      amLODIPine (NORVASC) 10 mg tablet Take 10 mg by mouth daily. Indications: HYPERTENSION             Reference my discharge instructions.     Follow-up Appointments   Procedures    FOLLOW UP VISIT Appointment in: 6 Weeks     Standing Status:   Standing     Number of Occurrences:   1     Order Specific Question:   Appointment in     Answer:   6 Weeks        Signed By:  Tommy Guzman CNM     2019                       BST

## 2019-12-31 NOTE — PROGRESS NOTES
0840: Patient rounds complete. Patient up in bathroom, \"too weak to take a shower\" but did do a sponge bath on self. Medications given. Discharge orders in. Patient up in chair eating breakfast. Will Check fundus when she is done eating and give discharge instructions. 1000: assessment completed and patient given medication for constipation. Has not had a BM since arriving to hospital.   1015: Pain medication given for abdominal pain. 1145: Checked on patient, doing well, no BM yet. 1400: patient had BM, spent 30 mins doing education on mom   Mother's postpartum care provided to include: Discussed lochia bleeding, call  If there is an increase in bleeding or several clots being passed, any foul odors or abnormal discharge colors. Nothing in the vagina for at least 6 weeks until postpartum visit. Discussed cramping and  incisional/Vaginal pain. Discussed baby blues, emotional changes, and signs of postpartum depression. Seek medical help ASAP if thoughts of harming self or baby. Mother offered keepsake for fetus, declines at this time. Support group information given to patient. NICU to do infant discharge teaching.

## 2019-12-31 NOTE — PROGRESS NOTES
Problem: Patient Education: Go to Patient Education Activity  Goal: Patient/Family Education  Outcome: Progressing Towards Goal     Problem:  Delivery: Postpartum Day 3  Goal: Off Pathway (Use only if patient is Off Pathway)  Outcome: Progressing Towards Goal  Goal: Activity/Safety  Outcome: Progressing Towards Goal  Goal: Consults, if ordered  Outcome: Progressing Towards Goal  Goal: Nutrition/Diet  Outcome: Progressing Towards Goal  Goal: Discharge Planning  Outcome: Progressing Towards Goal  Goal: Medications  Outcome: Progressing Towards Goal  Goal: Treatments/Interventions/Procedures  Outcome: Progressing Towards Goal  Goal: Psychosocial  Outcome: Progressing Towards Goal     Problem:  Delivery: Discharge Outcomes  Goal: *Follow-up appointments as indicated  Outcome: Progressing Towards Goal  Goal: *Describes available resources and support systems  Outcome: Progressing Towards Goal  Goal: *No signs and symptoms of infection  Outcome: Progressing Towards Goal  Goal: *Birth certificate information completed  Outcome: Progressing Towards Goal  Goal: *Received and verbalizes understanding of discharge plan and instructions  Outcome: Progressing Towards Goal  Goal: *Vital signs within defined limits  Outcome: Progressing Towards Goal  Goal: *Labs within defined limits  Outcome: Progressing Towards Goal  Goal: *Hemodynamically stable  Outcome: Progressing Towards Goal  Goal: *Optimal pain control at patient's stated goal  Outcome: Progressing Towards Goal  Goal: *Participates in infant care  Outcome: Progressing Towards Goal  Goal: *Demonstrates progressive activity  Outcome: Progressing Towards Goal  Goal: *Appropriate parent-infant bonding  Outcome: Progressing Towards Goal  Goal: *Tolerating diet  Outcome: Progressing Towards Goal  Goal: *Verbalizes name, dosage, time, side effects, and number of days to continue medications  Outcome: Progressing Towards Goal  Goal: *Influenza vaccine administered (October-March)  Outcome: Progressing Towards Goal     Problem: Falls - Risk of  Goal: *Absence of Falls  Description  Document Brennan Go Fall Risk and appropriate interventions in the flowsheet.   Outcome: Progressing Towards Goal  Note: Fall Risk Interventions:            Medication Interventions: Patient to call before getting OOB, Teach patient to arise slowly    Elimination Interventions: Call light in reach              Problem: Patient Education: Go to Patient Education Activity  Goal: Patient/Family Education  Outcome: Progressing Towards Goal     Problem: Pain  Goal: *Control of Pain  Outcome: Progressing Towards Goal     Problem: Patient Education: Go to Patient Education Activity  Goal: Patient/Family Education  Outcome: Progressing Towards Goal     RACHEAL RN

## 2019-12-31 NOTE — PROGRESS NOTES
Post-Operative  Day 4    Loree Jorgensen [place]       Assessment:   Hospital Problems  Date Reviewed: 2019          Codes Class Noted POA    Hypotension ICD-10-CM: I95.9  ICD-9-CM: 458.9  2019 No        H/O blood loss ICD-10-CM: Z87.898  ICD-9-CM: V12.59  2019 No        * (Principal) Postpartum hemorrhage ICD-10-CM: O72.1  ICD-9-CM: 666.10  2019 Yes         premature rupture of membranes ICD-10-CM: O42.919  ICD-9-CM: 658.10  2019 Unknown        Acute on chronic anemia ICD-10-CM: D64.9  ICD-9-CM: 285.9  2019 No            Post-Op day 3, doing well    Plan:   1. Discharge home today pending stable H&H  2. Follow up in office in 2 weeks with Lissette Srinivasan MD for Blood pressure and wound check   3. Post partum activity/wound care advised, diet as tolerated  4. Discharge Medications: ibuprofen, percocet, iron, blood pressure meds, and stool softener       Information for the patient's :  Bart Huber [[id number]]   , Low Transverse   Patient doing well without significant complaint. Tolerating diet, passing flatus, voiding and ambulating without difficulty. Pain well controlled. She has had a BM. Denies headaches, vision changes and epigastric pain. PIH s/sx reviewed in detail.      Current Facility-Administered Medications   Medication Dose Route Frequency    amLODIPine (NORVASC) tablet 10 mg  10 mg Oral DAILY    methyldopa (ALDOMET) tablet 250 mg  250 mg Oral Q12H    lactated Ringers infusion  125 mL/hr IntraVENous CONTINUOUS    oxytocin (PITOCIN) 20 units/1000 mL in 0.9% sodium chloride  125 mL/hr IntraVENous CONTINUOUS    sodium chloride (NS) flush 5-40 mL  5-40 mL IntraVENous Q8H    fluticasone propionate (FLONASE) 50 mcg/actuation nasal spray 2 Spray  2 Spray Both Nostrils DAILY       Vitals:  Visit Vitals  /86 (BP 1 Location: Right arm, BP Patient Position: At rest)   Pulse 97   Temp 98.6 °F (37 °C) Resp 17   Ht 5' 4\" (1.626 m)   Wt 63.5 kg (140 lb)   SpO2 100%   Breastfeeding Unknown   BMI 24.03 kg/m²     Temp (24hrs), Av.1 °F (37.3 °C), Min:98.3 °F (36.8 °C), Max:100.4 °F (38 °C)        Exam:        Patient without distress. Abdomen, bowel sounds present, soft, expected tenderness, fundus firm                Wound incision clean, dry and intact               Lower extremities are negative for swelling, cords or tenderness.     Labs:   Lab Results   Component Value Date/Time    WBC 16.5 (H) 2019 10:00 PM    WBC 15.5 (H) 2019 04:12 AM    WBC 19.2 (H) 2019 07:35 AM    HGB 7.8 (L) 2019 10:00 PM    HGB 7.5 (L) 2019 04:12 AM    HGB 8.7 (L) 2019 05:17 PM    HCT 23.3 (L) 2019 10:00 PM    HCT 22.0 (L) 2019 04:12 AM    HCT 25.6 (L) 2019 05:17 PM    PLATELET 750  10:00 PM    PLATELET 394  04:12 AM    PLATELET 900  07:35 AM       Recent Results (from the past 24 hour(s))   CBC W/O DIFF    Collection Time: 19 10:00 PM   Result Value Ref Range    WBC 16.5 (H) 4.6 - 13.2 K/uL    RBC 2.77 (L) 4.20 - 5.30 M/uL    HGB 7.8 (L) 12.0 - 16.0 g/dL    HCT 23.3 (L) 35.0 - 45.0 %    MCV 84.1 74.0 - 97.0 FL    MCH 28.2 24.0 - 34.0 PG    MCHC 33.5 31.0 - 37.0 g/dL    RDW 16.9 (H) 11.6 - 14.5 %    PLATELET 786 711 - 558 K/uL    MPV 9.2 9.2 - 11.8 FL

## 2019-12-31 NOTE — PROGRESS NOTES
Progress Note    Patient: Jesús Birmingham MRN: [id number]     YOB: 1980  Age: 46 y.o. Subjective:     Post-Operative Day: 4    The patient is feeling well. Pain is  well controlled with current medications. Baby isin NICU. Urinary output is adequate. The patient is ambulating well and is tolerating a regular diet. Pt is passing flatus. Objective:      Patient Vitals for the past 8 hrs:   BP Temp Pulse Resp SpO2   19 0537 (!) 144/91 98.9 °F (37.2 °C) 98 19 99 %   19 0356 -- 99.2 °F (37.3 °C) -- -- --   19 0259 (!) 158/93 100.4 °F (38 °C) (!) 107 17 100 %     General:    alert, cooperative   Bowel Sounds:  active   Lochia:  appropriate   Uterine Fundus:   firm @ U-2   Incision:  Dry & Intact    DVT Evaluation:  No evidence of DVT seen on physical exam.     Lab/Data Review:  Recent Results (from the past 24 hour(s))   CBC W/O DIFF    Collection Time: 19 10:00 PM   Result Value Ref Range    WBC 16.5 (H) 4.6 - 13.2 K/uL    RBC 2.77 (L) 4.20 - 5.30 M/uL    HGB 7.8 (L) 12.0 - 16.0 g/dL    HCT 23.3 (L) 35.0 - 45.0 %    MCV 84.1 74.0 - 97.0 FL    MCH 28.2 24.0 - 34.0 PG    MCHC 33.5 31.0 - 37.0 g/dL    RDW 16.9 (H) 11.6 - 14.5 %    PLATELET 983 720 - 850 K/uL    MPV 9.2 9.2 - 11.8 FL     All lab results for the last 24 hours reviewed. Assessment:     POD #2  Delivery: Repeat LTCS followed postpartum D&C   Status post: Doing well postpartum vaginal delivery     Plan:     Doing well postpartum  delivery. DC home. Follow-up in office in 3 days, call prn. Current Discharge Medication List      START taking these medications    Details   docusate sodium (COLACE) 100 mg capsule Take 1 Cap by mouth daily as needed for Constipation for up to 90 days. Indications: constipation  Qty: 30 Cap, Refills: 0      ibuprofen (MOTRIN) 800 mg tablet Take 1 Tab by mouth every eight (8) hours as needed for Pain.   Qty: 90 Tab, Refills: 0      oxyCODONE-acetaminophen (PERCOCET) 5-325 mg per tablet Take 1-2 Tabs by mouth every four (4) hours as needed for Pain for up to 3 days. Max Daily Amount: 12 Tabs. Qty: 25 Tab, Refills: 0    Associated Diagnoses:  delivery delivered         CONTINUE these medications which have NOT CHANGED    Details   PNV No12-Iron-FA-DSS-OM-3 29 mg iron-1 mg -50 mg CPKD Take  by mouth.      methyldopa (ALDOMET) 250 mg tablet Take 250 mg by mouth two (2) times a day. Indications: high blood pressure      ferrous sulfate 324 mg (65 mg iron) tablet Take 1 Tab by mouth Daily (before breakfast). Qty: 60 Tab, Refills: 0      amLODIPine (NORVASC) 10 mg tablet Take 10 mg by mouth daily.     Indications: HYPERTENSION             Signed By: Miguel Cheney CNM     2019

## 2019-12-31 NOTE — DISCHARGE SUMMARY
Discharge Summary     Patient: Isaac Fletcher MRN: [id number]  SSN: [id number]    YOB: 1980  Age: 46 y.o. Sex: female       Admit Date: 2019    Discharge Date: 2019      Admission Diagnoses:  premature rupture of membranes [O42.919]    Discharge Diagnoses:   Problem List as of 2019 Date Reviewed: 2019          Codes Class Noted - Resolved    Hypotension ICD-10-CM: I95.9  ICD-9-CM: 458.9  2019 - Present        H/O blood loss ICD-10-CM: Z87.898  ICD-9-CM: V12.59  2019 - Present        * (Principal) Postpartum hemorrhage ICD-10-CM: O72.1  ICD-9-CM: 666.10  2019 - Present         premature rupture of membranes ICD-10-CM: O42.919  ICD-9-CM: 658.10  2019 - Present        Acute on chronic anemia ICD-10-CM: D64.9  ICD-9-CM: 285.9  2019 - Present        Supervision of normal intrauterine pregnancy in multigravida in second trimester ICD-10-CM: Z34.82  ICD-9-CM: V22.1  2019 - Present        Pregnancy-induced hypertension in second trimester ICD-10-CM: O13.2  ICD-9-CM: 642.33  2019 - Present        RESOLVED: Supervision of other high-risk pregnancy(V23.89) ICD-10-CM: O09.899  ICD-9-CM: V23.89  2012 - 2012        RESOLVED: Previous  delivery, antepartum condition or complication PFS-20-TR: H63.740  ICD-9-CM: 654.23  2012 - 2012               Discharge Condition: Good    Hospital Course: complicated by pp bleeding and postpartum D&C following c/s    Consults: hospitalist    Significant Diagnostic Studies: labs: H&H-anemic    Disposition: home    Discharge Medications:   Current Discharge Medication List      START taking these medications    Details   docusate sodium (COLACE) 100 mg capsule Take 1 Cap by mouth daily as needed for Constipation for up to 90 days.  Indications: constipation  Qty: 30 Cap, Refills: 0      ibuprofen (MOTRIN) 800 mg tablet Take 1 Tab by mouth every eight (8) hours as needed for Pain. Qty: 90 Tab, Refills: 0      oxyCODONE-acetaminophen (PERCOCET) 5-325 mg per tablet Take 1-2 Tabs by mouth every four (4) hours as needed for Pain for up to 3 days. Max Daily Amount: 12 Tabs. Qty: 25 Tab, Refills: 0    Associated Diagnoses:  delivery delivered         CONTINUE these medications which have NOT CHANGED    Details   PNV No12-Iron-FA-DSS-OM-3 29 mg iron-1 mg -50 mg CPKD Take  by mouth.      methyldopa (ALDOMET) 250 mg tablet Take 250 mg by mouth two (2) times a day. Indications: high blood pressure      ferrous sulfate 324 mg (65 mg iron) tablet Take 1 Tab by mouth Daily (before breakfast). Qty: 60 Tab, Refills: 0      amLODIPine (NORVASC) 10 mg tablet Take 10 mg by mouth daily.     Indications: HYPERTENSION             Activity: Activity as tolerated and pelvic rest,   Diet: Regular Diet  Wound Care: Keep wound clean and dry    Follow-up Appointments   Procedures    FOLLOW UP VISIT Appointment in: 6 Weeks     Standing Status:   Standing     Number of Occurrences:   1     Order Specific Question:   Appointment in     Answer:   6 Weeks     Follow up in 3 days in office for bp follow up  Signed By: Pamela Fair CNM     2019

## 2020-01-06 ENCOUNTER — APPOINTMENT (OUTPATIENT)
Dept: INFUSION THERAPY | Age: 40
End: 2020-01-06
